# Patient Record
Sex: MALE | Race: WHITE | URBAN - METROPOLITAN AREA
[De-identification: names, ages, dates, MRNs, and addresses within clinical notes are randomized per-mention and may not be internally consistent; named-entity substitution may affect disease eponyms.]

---

## 2018-06-04 ENCOUNTER — EMERGENCY (EMERGENCY)
Facility: HOSPITAL | Age: 28
LOS: 0 days | Discharge: HOME | End: 2018-06-04
Attending: EMERGENCY MEDICINE | Admitting: EMERGENCY MEDICINE

## 2018-06-04 VITALS
OXYGEN SATURATION: 100 % | HEART RATE: 107 BPM | TEMPERATURE: 97 F | SYSTOLIC BLOOD PRESSURE: 148 MMHG | DIASTOLIC BLOOD PRESSURE: 80 MMHG | RESPIRATION RATE: 18 BRPM

## 2018-06-04 DIAGNOSIS — S61.303A UNSPECIFIED OPEN WOUND OF LEFT MIDDLE FINGER WITH DAMAGE TO NAIL, INITIAL ENCOUNTER: ICD-10-CM
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
## 2018-06-04 DIAGNOSIS — S69.92XA UNSPECIFIED INJURY OF LEFT WRIST, HAND AND FINGER(S), INITIAL ENCOUNTER: ICD-10-CM
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
## 2018-06-04 DIAGNOSIS — S60.413A ABRASION OF LEFT MIDDLE FINGER, INITIAL ENCOUNTER: ICD-10-CM

## 2018-06-04 NOTE — ED PROVIDER NOTE - OBJECTIVE STATEMENT
28 y/o male with pmhx of lipoma removal from abdomen 1 week ago, epidural hematoma s/p injury from hammer in 2016 presents with finger injury. Patient states he was putting the dumbbell down at the gym, accidentally crushed his left 3rd finger under it. Patient states his tetanus was updated 2 years ago when he sustained a hammer injury to head. Denies numbness, inability to move. Patient admits to taking aspirin everyday.
None

## 2018-06-04 NOTE — ED PROVIDER NOTE - PHYSICAL EXAMINATION
CONSTITUTIONAL: Well-developed; well-nourished; in no acute distress.   SKIN: +0.5cm abrasion to left 3rd digit, proximal to nail bed, no nail bed involvement; mild oozing noted   EXT: full ROM to left 3rd digit   NEURO: no numbness/tingling to left 3rd digit   PSYCH: Cooperative, appropriate.

## 2018-06-04 NOTE — ED PROVIDER NOTE - PROGRESS NOTE DETAILS
Attending note:  I personally evaluated the patient. I reviewed the Resident’s note (as assigned above), and agree with the findings and plan except as documented in my note.   26 y/o M with no PMH, tetanus UTD, takes ASA daily for " extra blood flow" prior to the gym, presents for evaluation of L finger abrasion s/p injury PTA. Pt states he was lifting an 80 lb barbell at the gym and dropped it onto his L 3rd digit, has been oozing blood since the time of injury. No other injuries. Denies LOC, nausea, vomiting.   Exam: Pt in NAD, AAOx3, (+)skin evulsion above the nail on the L 3rd digit, motor 5/5x4, sensation intact.   Plan: Pt stable for D/C. Attending note:  I personally evaluated the patient. I reviewed the Resident’s note (as assigned above), and agree with the findings and plan except as documented in my note.   26 y/o M presents for evaluation of L finger abrasion s/p injury PTA. Pt states he was lifting an 80 lb barbell at the gym and banged it against his L 3rd digit, has been oozing blood since the time of injury. No other injuries. Denies LOC, nausea, vomiting.   Exam: Pt in NAD, AAOx3, (+)skin avulsion above the nail on the L 3rd digit, FROM, no swelling. No active bleeding. Plan: Pt stable for D/C.

## 2018-06-24 ENCOUNTER — EMERGENCY (EMERGENCY)
Facility: HOSPITAL | Age: 28
LOS: 0 days | Discharge: HOME | End: 2018-06-24
Attending: EMERGENCY MEDICINE | Admitting: EMERGENCY MEDICINE

## 2018-06-24 VITALS
HEART RATE: 98 BPM | RESPIRATION RATE: 18 BRPM | DIASTOLIC BLOOD PRESSURE: 88 MMHG | OXYGEN SATURATION: 97 % | TEMPERATURE: 98 F | SYSTOLIC BLOOD PRESSURE: 138 MMHG
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
## 2018-06-24 DIAGNOSIS — S01.81XA LACERATION WITHOUT FOREIGN BODY OF OTHER PART OF HEAD, INITIAL ENCOUNTER: ICD-10-CM

## 2018-06-24 DIAGNOSIS — D17.1 BENIGN LIPOMATOUS NEOPLASM OF SKIN AND SUBCUTANEOUS TISSUE OF TRUNK: Chronic | ICD-10-CM
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
## 2018-06-24 NOTE — ED PROVIDER NOTE - ATTENDING CONTRIBUTION TO CARE
29 yo with mechanical fall a few hours ago.  hit chin and had laceration.  pt says came because wound kept bleeding.  pt with chin laceration about 1.2cm, no other injuries.  utd with tdap.  lac repair

## 2018-06-24 NOTE — ED PROCEDURE NOTE - ATTENDING CONTRIBUTION TO CARE
I personally evaluated the patient. I reviewed the Resident’s or Physician Assistant’s note (as assigned above), and agree with the findings and plan except as documented in my note.  I supervised the resident/PA or was immediately available to the resident/PA for this procedure.

## 2018-06-24 NOTE — ED PROVIDER NOTE - ENMT, MLM
Airway patent, Nasal mucosa clear. Mouth with normal mucosa. Throat has no vesicles, no oropharyngeal exudates and uvula is midline. normal oropharynx, no loose teeth, Airway patent, Nasal mucosa clear. Mouth with normal mucosa. Throat has no vesicles, no oropharyngeal exudates and uvula is midline. normal oropharynx, no loose teeth, no TMJ tenderness

## 2020-01-25 ENCOUNTER — EMERGENCY (EMERGENCY)
Facility: HOSPITAL | Age: 30
LOS: 0 days | Discharge: HOME | End: 2020-01-25
Admitting: EMERGENCY MEDICINE
Payer: COMMERCIAL

## 2020-01-25 VITALS
WEIGHT: 229.94 LBS | HEIGHT: 68 IN | HEART RATE: 79 BPM | TEMPERATURE: 98 F | OXYGEN SATURATION: 100 % | RESPIRATION RATE: 18 BRPM

## 2020-01-25 DIAGNOSIS — D17.1 BENIGN LIPOMATOUS NEOPLASM OF SKIN AND SUBCUTANEOUS TISSUE OF TRUNK: Chronic | ICD-10-CM

## 2020-01-25 DIAGNOSIS — R04.2 HEMOPTYSIS: ICD-10-CM

## 2020-01-25 DIAGNOSIS — K06.8 OTHER SPECIFIED DISORDERS OF GINGIVA AND EDENTULOUS ALVEOLAR RIDGE: ICD-10-CM

## 2020-01-25 PROCEDURE — 99283 EMERGENCY DEPT VISIT LOW MDM: CPT

## 2020-01-25 RX ORDER — CHLORHEXIDINE GLUCONATE 213 G/1000ML
15 SOLUTION TOPICAL
Qty: 210 | Refills: 0
Start: 2020-01-25 | End: 2020-01-31

## 2020-01-25 NOTE — ED PROVIDER NOTE - PATIENT PORTAL LINK FT
You can access the FollowMyHealth Patient Portal offered by [facility] by registering at the following website: [email or web address]. By joining [employer]’s FollowMyHealth portal, you will also be able to view your health information using other applications (apps) compatible with our system.

## 2020-01-25 NOTE — ED PROVIDER NOTE - NS ED ROS FT
Review of Systems:  	•	CONSTITUTIONAL - no fever, no diaphoresis, no chills  	•	SKIN - no rash  	•	HEMATOLOGIC - + bleeding, no bruising  	•	EYES - no eye pain, no blurry vision  	•	ENT - no change in hearing, no sore throat, no ear pain or tinnitus  	•	RESPIRATORY - no shortness of breath, no cough  	•	CARDIAC - no chest pain, no palpitations  	•	GI - no abd pain, no nausea, no vomiting, no diarrhea, no constipation  	•	GENITO-URINARY - no discharge, no dysuria; no hematuria, no increased urinary frequency  	•	MUSCULOSKELETAL - no joint paint, no swelling, no redness  	•	NEUROLOGIC - no weakness, no headache, no paresthesias, no LOC
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
## 2020-01-25 NOTE — ED PROVIDER NOTE - PHYSICAL EXAMINATION
CONST: Well appearing in NAD  EYES: PERRL, EOMI, Sclera and conjunctiva clear.   ENT: + blood to gums of tooth 24 and 25, no avulsions, no loose teeth, No nasal discharge.  Oropharynx normal appearing, no erythema or exudates. No abscess or swelling. Uvula midline.   NECK: Non-tender, no meningeal signs. normal ROM. supple   CARD: Normal S1 S2; Normal rate and rhythm  RESP: Equal BS B/L, No wheezes, rhonchi or rales. No distress  GI: Soft, non-tender, non-distended. no cva tenderness. normal BS  MS: Normal ROM in all extremities. No midline spinal tenderness. pulses 2 +. no calf tenderness or swelling  SKIN: Warm, dry, no acute rashes. Good turgor

## 2020-10-02 ENCOUNTER — APPOINTMENT (OUTPATIENT)
Dept: SURGERY | Facility: CLINIC | Age: 30
End: 2020-10-02
Payer: COMMERCIAL

## 2020-10-02 VITALS
SYSTOLIC BLOOD PRESSURE: 132 MMHG | HEART RATE: 100 BPM | BODY MASS INDEX: 30.92 KG/M2 | DIASTOLIC BLOOD PRESSURE: 84 MMHG | WEIGHT: 204 LBS | TEMPERATURE: 97.9 F | HEIGHT: 68 IN

## 2020-10-02 DIAGNOSIS — Z78.9 OTHER SPECIFIED HEALTH STATUS: ICD-10-CM

## 2020-10-02 DIAGNOSIS — Z82.49 FAMILY HISTORY OF ISCHEMIC HEART DISEASE AND OTHER DISEASES OF THE CIRCULATORY SYSTEM: ICD-10-CM

## 2020-10-02 DIAGNOSIS — Z80.1 FAMILY HISTORY OF MALIGNANT NEOPLASM OF TRACHEA, BRONCHUS AND LUNG: ICD-10-CM

## 2020-10-02 PROBLEM — Z00.00 ENCOUNTER FOR PREVENTIVE HEALTH EXAMINATION: Status: ACTIVE | Noted: 2020-10-02

## 2020-10-02 PROCEDURE — 99203 OFFICE O/P NEW LOW 30 MIN: CPT

## 2020-10-07 ENCOUNTER — APPOINTMENT (OUTPATIENT)
Dept: SURGERY | Facility: CLINIC | Age: 30
End: 2020-10-07
Payer: COMMERCIAL

## 2020-10-07 VITALS
DIASTOLIC BLOOD PRESSURE: 78 MMHG | WEIGHT: 204 LBS | HEIGHT: 68 IN | SYSTOLIC BLOOD PRESSURE: 120 MMHG | BODY MASS INDEX: 30.92 KG/M2 | TEMPERATURE: 97.4 F | HEART RATE: 90 BPM

## 2020-10-07 DIAGNOSIS — K40.90 UNILATERAL INGUINAL HERNIA, W/OUT OBSTRUCTION OR GANGRENE, NOT SPECIFIED AS RECURRENT: ICD-10-CM

## 2020-10-07 PROCEDURE — 99241 OFFICE CONSULTATION NEW/ESTAB PATIENT 15 MIN: CPT

## 2020-10-07 NOTE — HISTORY OF PRESENT ILLNESS
[de-identified] : 31yo male with PSHx of right inguinal hernia repair as infant presenting with concern for inguinal hernia. Patient states that he has had vague right groin pain for the past few weeks and then, while working out, he experienced sudden severe pain and swelling in the right groin. Denies fever/chills, difficulty with bowel movements, +flatus. He reports that he works out often and is a "gym rat."

## 2020-10-07 NOTE — ASSESSMENT
[FreeTextEntry1] : 29yo male with previous right inguinal hernia repair as infant presenting with recurrent right inguinal hernia, possible bilateral hernias.\par -ordered CT pelvis\par -recommend ice packs\par -continue surgical planning as scheduled for 10/22

## 2020-10-07 NOTE — HISTORY OF PRESENT ILLNESS
[de-identified] : 31yo male with PSHx of right inguinal hernia repair as infant previously seen last week for right groin pain consistent with inguinal pain. Patient called office yesterday complaining of groin bruising. Denies pain or fever/chills. He is ambulating well. Denies trauma or exercise or straining. He says that the swelling in the right groin has resolved.  Denies fever/chills, difficulty with bowel movements, +flatus.

## 2020-10-07 NOTE — CONSULT LETTER
[Courtesy Letter:] : I had the pleasure of seeing your patient, [unfilled], in my office today. [Please see my note below.] : Please see my note below. [Sincerely,] : Sincerely, [Dear  ___] : Dear  [unfilled], [FreeTextEntry3] : Lia Mcclendon MD\par Bariatric & Minimally Invasive Surgery\par [facility]\par [phone]

## 2020-10-07 NOTE — ASSESSMENT
[FreeTextEntry1] : 31yo male with previous right inguinal hernia repair as infant presenting with recurrent right inguinal hernia, possible bilateral hernias.\par -risks, benefits, and alteratives were explained to the patient - including risk of bleeding, pain, and infection\par -all questions were answered\par -recommend laparoscopic right inguinal hernia repair with mesh, possible bilateral\par -explained that patient should avoid straining and continued weight training until approximately 2 months after repair\par -also explained that he is at a higher than average risk of hernia recurrence if he continues to exercise to the extreme that he does\par -waive PAST\par -return to office post-operatively

## 2020-10-07 NOTE — PHYSICAL EXAM
[Normal Breath Sounds] : Normal breath sounds [Normal Heart Sounds] : normal heart sounds [Alert] : alert [Calm] : calm [de-identified] : no acute distress [de-identified] : soft, right inguinal swelling and mild tenderness, left sided mild swelling [de-identified] : no CVA tenderness [de-identified] : full ROM x 4; mesomorphic

## 2020-10-07 NOTE — PHYSICAL EXAM
[Normal Breath Sounds] : Normal breath sounds [Normal Heart Sounds] : normal heart sounds [Alert] : alert [Calm] : calm [de-identified] : no acute distress [de-identified] : soft, right inguinal swelling and mild tenderness, left sided mild swelling; diffuse ecchymosis of the scrotum and base of penis [de-identified] : no CVA tenderness [de-identified] : full ROM x 4; mesomorphic

## 2020-10-15 ENCOUNTER — OUTPATIENT (OUTPATIENT)
Dept: OUTPATIENT SERVICES | Facility: HOSPITAL | Age: 30
LOS: 1 days | Discharge: HOME | End: 2020-10-15
Payer: COMMERCIAL

## 2020-10-15 ENCOUNTER — RESULT REVIEW (OUTPATIENT)
Age: 30
End: 2020-10-15

## 2020-10-15 DIAGNOSIS — K40.90 UNILATERAL INGUINAL HERNIA, WITHOUT OBSTRUCTION OR GANGRENE, NOT SPECIFIED AS RECURRENT: ICD-10-CM

## 2020-10-15 DIAGNOSIS — D17.1 BENIGN LIPOMATOUS NEOPLASM OF SKIN AND SUBCUTANEOUS TISSUE OF TRUNK: Chronic | ICD-10-CM

## 2020-10-15 PROCEDURE — 74177 CT ABD & PELVIS W/CONTRAST: CPT | Mod: 26

## 2020-10-19 ENCOUNTER — NON-APPOINTMENT (OUTPATIENT)
Age: 30
End: 2020-10-19

## 2020-10-22 ENCOUNTER — APPOINTMENT (OUTPATIENT)
Dept: SURGERY | Facility: HOSPITAL | Age: 30
End: 2020-10-22

## 2021-07-04 ENCOUNTER — EMERGENCY (EMERGENCY)
Facility: HOSPITAL | Age: 31
LOS: 0 days | Discharge: HOME | End: 2021-07-04
Attending: EMERGENCY MEDICINE | Admitting: EMERGENCY MEDICINE
Payer: COMMERCIAL

## 2021-07-04 VITALS
RESPIRATION RATE: 17 BRPM | HEIGHT: 68 IN | OXYGEN SATURATION: 99 % | WEIGHT: 205.03 LBS | HEART RATE: 89 BPM | SYSTOLIC BLOOD PRESSURE: 170 MMHG | DIASTOLIC BLOOD PRESSURE: 94 MMHG | TEMPERATURE: 98 F

## 2021-07-04 DIAGNOSIS — S01.80XA UNSPECIFIED OPEN WOUND OF OTHER PART OF HEAD, INITIAL ENCOUNTER: ICD-10-CM

## 2021-07-04 DIAGNOSIS — D17.1 BENIGN LIPOMATOUS NEOPLASM OF SKIN AND SUBCUTANEOUS TISSUE OF TRUNK: Chronic | ICD-10-CM
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
## 2021-07-04 DIAGNOSIS — L73.1 PSEUDOFOLLICULITIS BARBAE: ICD-10-CM
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
## 2021-07-04 PROCEDURE — 99283 EMERGENCY DEPT VISIT LOW MDM: CPT

## 2021-07-04 NOTE — ED PROVIDER NOTE - OBJECTIVE STATEMENT
32 y/o male presents to the ED c/o "I had a hair transplant in [place] 3 months ago. A common side affect is ingrown hairs. I had a ingrown hair that was bleeding a couple of weeks ago. Today I brushed my hair and the scab became dislodged and began bleeding heavily. I controlled it with a towel." no dizziness/ weakness

## 2021-07-04 NOTE — ED PROVIDER NOTE - ATTENDING CONTRIBUTION TO CARE
ATTENDING NOTE: I personally evaluated the patient. I reviewed the Physician Assistant’s note (as assigned above), and agree with the findings and plan except as documented in my note.     32 y/o M presents to ED for bleeding scab on his head. Of note pt received hair transplant surgery in [place] 3 mo prior, since then he has suffered from ingrown hairs and some bleeding. He notices the bleeding when he nolan his hair and reports that when these episodes occur, it bleeds a lot. Last episode today, bleeding stopped PTA. Wants to know if there are any meds he can take if this happens again. No trauma.    Const: Well nourished, well developed, appears stated age. Eyes: PERRL, no conjunctival injection. HENT: Neck supple without meningismus. CV: RRR, Warm, well-perfused extremities. RESP: no respiratory distress. Skin: Warm, dry. (+)Small scab to R temporal region of scalp, no active bleeding. No rashes.

## 2021-07-04 NOTE — ED PROVIDER NOTE - PROGRESS NOTE DETAILS
ATTENDING NOTE: I personally evaluated the patient. I reviewed the Physician Assistant’s note (as assigned above), and agree with the findings and plan except as documented in my note.   30 y/o M presents to ED for bleeding scab on his head. Of note pt received hair transplant surgery in [place] 3 mo prior, since then he has suffered from ingrown hairs and some bleeding. He notices the bleeding when he nolan his hair and reports that when these episodes occur, it bleeds a lot. Last episode today, bleeding stopped PTA. Wants to know if there are any meds he can take if this happens again. No trauma.  Const: Well nourished, well developed, appears stated age. Eyes: PERRL, no conjunctival injection. HENT: Neck supple without meningismus. CV: RRR, Warm, well-perfused extremities. RESP: no respiratory distress. Skin: Warm, dry. (+)Small scab to R temporal region of scalp, no active bleeding. No rashes. Neuro: Alert, CNs II-XII grossly intact. Sensation and motor function of extremities grossly intact. Psych: Appropriate mood and affect.

## 2021-07-04 NOTE — ED PROVIDER NOTE - PATIENT PORTAL LINK FT
You can access the FollowMyHealth Patient Portal offered by [facility] by registering at the following website: [email or web address]. By joining [employer]’s FollowMyHealth portal, you will also be able to view your health information using other applications (apps) compatible with our system.

## 2021-07-04 NOTE — ED ADULT TRIAGE NOTE - CHIEF COMPLAINT QUOTE
"I had a hair transplant in [place] (3 months ago) I had a few ingrown hairs" Pt reports part of the hair transplant is bleeding. Minimal bleeding noted, pt applying pressure with towel in triage. NO other complaints.

## 2021-11-12 ENCOUNTER — TRANSCRIPTION ENCOUNTER (OUTPATIENT)
Age: 31
End: 2021-11-12

## 2021-11-13 ENCOUNTER — APPOINTMENT (OUTPATIENT)
Age: 31
End: 2021-11-13

## 2021-12-03 NOTE — ED PROVIDER NOTE - CHIEF COMPLAINT
Department of Emergency Medicine   ED  Provider Note  Admit Date/RoomTime: 12/3/2021  5:24 PM  ED Room: MARY/MARY          History of Present Illness:  12/3/21, Time: 5:29 PM EST  Chief Complaint   Patient presents with    Back Pain     back spasms x4 weeks, no relief. given Norco @1430 and has lido. patch on from [address] [facility] [address] is a 78 y.o. female presenting to the ED for lower and mid back pain, beginning 3-4 weeks ago. The complaint has been persistent, moderate in severity, and worsened by changing position, walking, bending. Patient states that she resides at a nursing facility, uses a walker to ambulate. She states that 3 weeks ago she began to have soreness and what she felt was spasm in the right side of her lower back. Since onset the spasm type sensation has now become more diffuse across her lower back and mid back. She states that twisting movements or changing positions worsens the pain as well as attempting to straighten up after using her walker. She denies any leg weakness or numbness/tingling. No recent falls or trauma. She denies any urine/fecal incontinence/retention. Denies any saddle paresthesias. No abdominal pain.     Review of Systems:   Pertinent positives and negatives are stated within HPI, all other systems reviewed and are negative.        --------------------------------------------- PAST HISTORY ---------------------------------------------  Past Medical History:  has a past medical history of Arthritis, Asthma, Atherosclerosis of native artery of left lower extremity with ulceration of midfoot ([address] Bronchitis, CAD (coronary artery disease), Cough, Dermatophytosis, Diabetes mellitus ([address] GERD (gastroesophageal reflux disease), History of pulmonary embolus (PE), Hx of blood clots, Hyperlipidemia, Hypertension, Leg swelling, Lung disease, Peripheral vascular angioplasty status, Pseudoaneurysm of right femoral artery ([facility] PVD (peripheral vascular disease) with claudication ([facility] Restless legs syndrome, Rhinitis, allergic, Sinusitis, SOB (shortness of breath), Type 1 diabetes mellitus with left diabetic foot ulcer ([facility] Ulcerated, foot, left, limited to breakdown of skin ([address] and Urinary incontinence. Past Surgical History:  has a past surgical history that includes Hysterectomy; Cholecystectomy; Tonsillectomy and adenoidectomy; Coronary artery bypass graft; Abdomen surgery; Appendectomy; Colonoscopy; Endoscopy, colon, diagnostic; Cardiac surgery; Foot Debridement (Left, 5/16/2021); and Foot Debridement (Left, 5/21/2021). Social History:  reports that she has never smoked. She has never used smokeless tobacco. She reports current alcohol use. She reports that she does not use drugs. Family History: family history includes Heart Disease in her brother; Hypertension in her father. . Unless otherwise noted, family history is non contributory    The patients home medications have been reviewed. Allergies: Lisinopril        ---------------------------------------------------PHYSICAL EXAM--------------------------------------    Constitutional/General: Alert and oriented x3  Head: Normocephalic and atraumatic  Eyes: PERRL, EOMI, sclera non icteric  Mouth: Oropharynx clear, handling secretions, no trismus, no asymmetry of the posterior oropharynx or uvular edema  Neck: Supple, full ROM, no stridor, no meningeal signs  Respiratory: Lungs clear to auscultation bilaterally, no wheezes, rales, or rhonchi. Not in respiratory distress  Cardiovascular:  Regular rate. Regular rhythm. No murmurs, no aortic murmurs, no gallops, or rubs. 2+ distal pulses. Equal extremity pulses. Chest: No chest wall tenderness  GI:  Abdomen Soft, Non tender, Non distended. No rebound, guarding, or rigidity. No pulsatile masses. Musculoskeletal: Moves all extremities x 4. Warm and well perfused, no clubbing, cyanosis, or edema.  Capillary refill <3 seconds  Integument: skin warm and dry. No rashes. Neurologic: GCS 15, no focal deficits, symmetric strength 5/5 in the upper and lower extremities bilaterally  Psychiatric: Normal Affect          -------------------------------------------------- RESULTS -------------------------------------------------  I have personally reviewed all laboratory and imaging results for this patient. Results are listed below.      LABS: (Lab results interpreted by me)  Results for orders placed or performed during the hospital encounter of 12/03/21   CBC Auto Differential   Result Value Ref Range    WBC 10.0 4.5 - 11.5 E9/L    RBC 2.89 (L) 3.50 - 5.50 E12/L    Hemoglobin 9.7 (L) 11.5 - 15.5 g/dL    Hematocrit 29.0 (L) 34.0 - 48.0 %    .3 (H) 80.0 - 99.9 fL    MCH 33.6 26.0 - 35.0 pg    MCHC 33.4 32.0 - 34.5 %    RDW 13.5 11.5 - 15.0 fL    Platelets 066 690 - 066 E9/L    MPV 9.9 7.0 - 12.0 fL    Neutrophils % 77.7 43.0 - 80.0 %    Immature Granulocytes % 0.4 0.0 - 5.0 %    Lymphocytes % 13.6 (L) 20.0 - 42.0 %    Monocytes % 6.7 2.0 - 12.0 %    Eosinophils % 1.4 0.0 - 6.0 %    Basophils % 0.2 0.0 - 2.0 %    Neutrophils Absolute 7.80 (H) 1.80 - 7.30 E9/L    Immature Granulocytes # 0.04 E9/L    Lymphocytes Absolute 1.37 (L) 1.50 - 4.00 E9/L    Monocytes Absolute 0.67 0.10 - 0.95 E9/L    Eosinophils Absolute 0.14 0.05 - 0.50 E9/L    Basophils Absolute 0.02 0.00 - 0.20 E4/E   Basic Metabolic Panel w/ Reflex to MG   Result Value Ref Range    Sodium 134 132 - 146 mmol/L    Potassium reflex Magnesium 4.3 3.5 - 5.0 mmol/L    Chloride 100 98 - 107 mmol/L    CO2 24 22 - 29 mmol/L    Anion Gap 10 7 - 16 mmol/L    Glucose 183 (H) 74 - 99 mg/dL    BUN 18 6 - 23 mg/dL    CREATININE 0.9 0.5 - 1.0 mg/dL    GFR Non-African American 58 >=60 mL/min/1.73    GFR African American >60     Calcium 9.5 8.6 - 10.2 mg/dL   Urinalysis, reflex to microscopic   Result Value Ref Range    Color, UA Yellow Straw/Yellow    Clarity, UA Clear Clear    Glucose, Ur Negative Negative mg/dL morphine (PF) injection 4 mg (4 mg IntraVENous Given 12/3/21 1803)   orphenadrine (NORFLEX) injection 60 mg (60 mg IntraVENous Given 12/3/21 1803)   morphine (PF) injection 4 mg (4 mg IntraVENous Given 12/3/21 2158)               Medical Decision Making:     I, Dr. Lidia Gregorio, am the primary provider of record    78year-old female presenting with lower and mid back pain and spasm. During my evaluation as she would attempt to move from my exam she would have obvious discomfort and spasm. She has no tenderness to her spine, lower extremities are unremarkable with good muscle strength. She has had no fall or trauma. She has no other symptoms to suggest intra-abdominal abnormality, no red flag symptoms to suggest spinal emergency. She is hemodynamically stable and well-appearing. Metabolic panel is within acceptable limits, no leukocytosis. Urinalysis is unremarkable for infection. Imaging of the thoracic and lumbar spine show old compression fractures but also a acute/subacute compression fracture of L5 as well as T8. Patient is tender in the area of L5, not as much at T8. Would consider L5 a possible new compression fracture. Despite muscle relaxer and pain medication she is still having difficulty moving. Family and patient feel more comfortable with admission for further management as her mobility has been significantly limited over the past several days. Patient will be admitted for further management, resting comfortably during the remainder of her ED course after further pain medication. Re-Evaluations: This patient's ED course included: a personal history and physicial examination, re-evaluation prior to disposition and IV medications    This patient has remained hemodynamically stable and been closely monitored during their ED course. Counseling:    The emergency provider has spoken with the patient and family member son and discussed todays results, in addition to providing specific details for the plan of care and counseling regarding the diagnosis and prognosis. Questions are answered at this time and they are agreeable with the plan.       --------------------------------- IMPRESSION AND DISPOSITION ---------------------------------    IMPRESSION  1. Compression fracture of L5 lumbar vertebra, closed, initial encounter ([facility]    2. Compression fracture of T8 vertebra, initial encounter ([facility]    3. Difficulty in walking        DISPOSITION  Disposition: Admit to med/surg floor  Patient condition is stable        NOTE: This report was transcribed using voice recognition software.  Every effort was made to ensure accuracy; however, inadvertent computerized transcription errors may be present        Jw Hampton DO  12/03/21 [id number] The patient is a 31y Male complaining of medical evaluation.

## 2022-07-15 ENCOUNTER — EMERGENCY (EMERGENCY)
Facility: HOSPITAL | Age: 32
LOS: 0 days | Discharge: HOME | End: 2022-07-15
Attending: EMERGENCY MEDICINE | Admitting: EMERGENCY MEDICINE

## 2022-07-15 VITALS
OXYGEN SATURATION: 97 % | SYSTOLIC BLOOD PRESSURE: 150 MMHG | RESPIRATION RATE: 18 BRPM | WEIGHT: 216.05 LBS | HEIGHT: 68 IN | HEART RATE: 89 BPM | DIASTOLIC BLOOD PRESSURE: 85 MMHG | TEMPERATURE: 97 F

## 2022-07-15 DIAGNOSIS — Z53.21 PROCEDURE AND TREATMENT NOT CARRIED OUT DUE TO PATIENT LEAVING PRIOR TO BEING SEEN BY HEALTH CARE PROVIDER: ICD-10-CM

## 2022-07-15 DIAGNOSIS — D17.1 BENIGN LIPOMATOUS NEOPLASM OF SKIN AND SUBCUTANEOUS TISSUE OF TRUNK: Chronic | ICD-10-CM

## 2022-07-15 DIAGNOSIS — R04.0 EPISTAXIS: ICD-10-CM

## 2022-07-15 PROCEDURE — L9991: CPT

## 2022-07-15 NOTE — ED PROVIDER NOTE - NS ED ATTENDING STATEMENT MOD
This was a shared visit with the TRAN. I reviewed and verified the documentation and independently performed the documented:

## 2022-07-15 NOTE — ED PROVIDER NOTE - CLINICAL SUMMARY MEDICAL DECISION MAKING FREE TEXT BOX
32-year-old male presents emergency department complaining of epistaxis.  Left on his own volition according to nursing staff prior to formal PA or physician evaluation.  Agree with PA's note.

## 2022-07-15 NOTE — ED PROVIDER NOTE - ATTENDING APP SHARED VISIT CONTRIBUTION OF CARE
I reviewed the Resident´s or Physician Assistant´s note (as assigned above), and agree with the findings and plan except as documented in my note.    32-year-old male presents to the Emergency Department complaining of epistaxis.  Patient eloped prior to my formal evaluation.  Agree with PAs note

## 2022-09-26 ENCOUNTER — EMERGENCY (EMERGENCY)
Facility: HOSPITAL | Age: 32
LOS: 0 days | Discharge: HOME | End: 2022-09-26
Attending: EMERGENCY MEDICINE | Admitting: EMERGENCY MEDICINE

## 2022-09-26 VITALS
HEIGHT: 68 IN | SYSTOLIC BLOOD PRESSURE: 154 MMHG | WEIGHT: 214.95 LBS | HEART RATE: 87 BPM | OXYGEN SATURATION: 99 % | RESPIRATION RATE: 16 BRPM | TEMPERATURE: 97 F | DIASTOLIC BLOOD PRESSURE: 100 MMHG

## 2022-09-26 DIAGNOSIS — D17.1 BENIGN LIPOMATOUS NEOPLASM OF SKIN AND SUBCUTANEOUS TISSUE OF TRUNK: Chronic | ICD-10-CM

## 2022-09-26 DIAGNOSIS — Z86.018 PERSONAL HISTORY OF OTHER BENIGN NEOPLASM: ICD-10-CM

## 2022-09-26 DIAGNOSIS — S81.811A LACERATION WITHOUT FOREIGN BODY, RIGHT LOWER LEG, INITIAL ENCOUNTER: ICD-10-CM
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
## 2022-09-26 DIAGNOSIS — Z23 ENCOUNTER FOR IMMUNIZATION: ICD-10-CM
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
## 2022-09-26 PROCEDURE — 73590 X-RAY EXAM OF LOWER LEG: CPT | Mod: 26,RT

## 2022-09-26 PROCEDURE — 99283 EMERGENCY DEPT VISIT LOW MDM: CPT | Mod: 25

## 2022-09-26 PROCEDURE — 12001 RPR S/N/AX/GEN/TRNK 2.5CM/<: CPT

## 2022-09-26 RX ORDER — TETANUS TOXOID, REDUCED DIPHTHERIA TOXOID AND ACELLULAR PERTUSSIS VACCINE, ADSORBED 5; 2.5; 8; 8; 2.5 [IU]/.5ML; [IU]/.5ML; UG/.5ML; UG/.5ML; UG/.5ML
0.5 SUSPENSION INTRAMUSCULAR ONCE
Refills: 0 | Status: COMPLETED | OUTPATIENT
Start: 2022-09-26 | End: 2022-09-26

## 2022-09-26 RX ADMIN — TETANUS TOXOID, REDUCED DIPHTHERIA TOXOID AND ACELLULAR PERTUSSIS VACCINE, ADSORBED 0.5 MILLILITER(S): 5; 2.5; 8; 8; 2.5 SUSPENSION INTRAMUSCULAR at 09:20

## 2022-09-26 NOTE — ED PROVIDER NOTE - PHYSICAL EXAMINATION
CONST: Well appearing in NAD  MS: Normal ROM in all extremities. No midline spinal tenderness. 1.5 cm linear lac to mid anterior shin right leg. Mild bleeding with hematoma. No NV injury or FB noted  SKIN: Warm, dry, no acute rashes. Good turgor  NEURO: A&Ox3, No focal deficits. Strength 5/5 with no sensory deficits. Steady gait

## 2022-09-26 NOTE — ED PROVIDER NOTE - CLINICAL SUMMARY MEDICAL DECISION MAKING FREE TEXT BOX
Patient presented status post accidentally scraping her shin on concrete steps prior to arrival.  States that she is having pain around the right tib-fib area since the incident with laceration.  Has been able to ambulate without difficulty.  Otherwise on arrival patient afebrile, hemodynamically stable, fully neurovascularly intact.  Laceration noted but bleeding had been controlled prior to arrival.  X-ray obtained and negative for acute fracture or dislocation.  Laceration irrigated and sutured in ED without complication.  Given Tdap.  Given the above, will discharge home with outpatient follow up. Patient agreeable with plan. Agrees to return to ED for any new or worsening symptoms.

## 2022-09-26 NOTE — ED PROVIDER NOTE - OBJECTIVE STATEMENT
Pt sustained lac to RLE 10pm last night. trip and banged shin against edge of concrete steps. Denies head injury, LOC. Able to walk on the leg. Has 1.5 cm lac that continues to bleed

## 2022-09-26 NOTE — ED PROVIDER NOTE - PATIENT PORTAL LINK FT
You can access the FollowMyHealth Patient Portal offered by [facility] by registering at the following website: [email or web address]. By joining [employer]’s FollowMyHealth portal, you will also be able to view your health information using other applications (apps) compatible with our system.

## 2022-09-26 NOTE — ED PROVIDER NOTE - CARE PROVIDER_API CALL
sutures to be removed in 10 days by PMD or return to urgent care. Return is fever, worsening pain, swelling or redness.,   Phone: (   )    -  Fax: (   )    -  Follow Up Time:

## 2022-09-26 NOTE — ED ADULT TRIAGE NOTE - CHIEF COMPLAINT QUOTE
pt reports falling down the stairs last night, presents with lacerations to his L leg  (+)ROM,(+)pulse  (-)HT/(-)LOC/(-)AC pt reports falling down the stairs last night, presents with lacerations to his R leg  (+)ROM,(+)pulse  (-)HT/(-)LOC/(-)AC

## 2022-09-26 NOTE — ED ADULT NURSE NOTE - CHIEF COMPLAINT QUOTE
pt reports falling down the stairs last night, presents with lacerations to his R leg  (+)ROM,(+)pulse  (-)HT/(-)LOC/(-)AC

## 2022-09-26 NOTE — ED ADULT NURSE NOTE - PAIN: BODY LOCATION
RICK[facility] scheduling packet completed and sent to [facility] by writer via [employer].    No authorization required per referral #[id number].    Awaiting pt to complete pre-op labs and EKG to complete L/[facility] scheduling packet.   ankle/Right:

## 2022-09-26 NOTE — ED PROVIDER NOTE - PROVIDER TOKENS
FREE:[LAST:[sutures to be removed in 10 days by PMD or return to urgent care. Return is fever, worsening pain, swelling or redness.],PHONE:[(   )    -],FAX:[(   )    -]]

## 2022-09-26 NOTE — ED ADULT NURSE NOTE - NSIMPLEMENTINTERV_GEN_ALL_ED
Implemented All Fall Risk Interventions:  [place] to call system. Call bell, personal items and telephone within reach. Instruct patient to call for assistance. Room bathroom lighting operational. Non-slip footwear when patient is off stretcher. Physically safe environment: no spills, clutter or unnecessary equipment. Stretcher in lowest position, wheels locked, appropriate side rails in place. Provide visual cue, wrist band, yellow gown, etc. Monitor gait and stability. Monitor for mental status changes and reorient to person, place, and time. Review medications for side effects contributing to fall risk. Reinforce activity limits and safety measures with patient and family.

## 2022-10-01 ENCOUNTER — EMERGENCY (EMERGENCY)
Facility: HOSPITAL | Age: 32
LOS: 0 days | Discharge: HOME | End: 2022-10-01

## 2022-10-01 DIAGNOSIS — D17.1 BENIGN LIPOMATOUS NEOPLASM OF SKIN AND SUBCUTANEOUS TISSUE OF TRUNK: Chronic | ICD-10-CM

## 2022-10-01 PROCEDURE — L9992: CPT

## 2022-10-05 DIAGNOSIS — Z53.21 PROCEDURE AND TREATMENT NOT CARRIED OUT DUE TO PATIENT LEAVING PRIOR TO BEING SEEN BY HEALTH CARE PROVIDER: ICD-10-CM

## 2022-10-06 DIAGNOSIS — Z02.9 ENCOUNTER FOR ADMINISTRATIVE EXAMINATIONS, UNSPECIFIED: ICD-10-CM

## 2022-10-08 ENCOUNTER — EMERGENCY (EMERGENCY)
Facility: HOSPITAL | Age: 32
LOS: 0 days | Discharge: HOME | End: 2022-10-08
Attending: STUDENT IN AN ORGANIZED HEALTH CARE EDUCATION/TRAINING PROGRAM | Admitting: STUDENT IN AN ORGANIZED HEALTH CARE EDUCATION/TRAINING PROGRAM

## 2022-10-08 VITALS
HEART RATE: 97 BPM | TEMPERATURE: 97 F | SYSTOLIC BLOOD PRESSURE: 129 MMHG | DIASTOLIC BLOOD PRESSURE: 78 MMHG | HEIGHT: 68 IN | OXYGEN SATURATION: 100 % | RESPIRATION RATE: 18 BRPM | WEIGHT: 214.95 LBS

## 2022-10-08 DIAGNOSIS — R21 RASH AND OTHER NONSPECIFIC SKIN ERUPTION: ICD-10-CM

## 2022-10-08 DIAGNOSIS — L03.115 CELLULITIS OF RIGHT LOWER LIMB: ICD-10-CM

## 2022-10-08 DIAGNOSIS — M79.661 PAIN IN RIGHT LOWER LEG: ICD-10-CM

## 2022-10-08 DIAGNOSIS — D17.1 BENIGN LIPOMATOUS NEOPLASM OF SKIN AND SUBCUTANEOUS TISSUE OF TRUNK: Chronic | ICD-10-CM

## 2022-10-08 DIAGNOSIS — M79.89 OTHER SPECIFIED SOFT TISSUE DISORDERS: ICD-10-CM

## 2022-10-08 DIAGNOSIS — Z48.02 ENCOUNTER FOR REMOVAL OF SUTURES: ICD-10-CM

## 2022-10-08 PROCEDURE — 73610 X-RAY EXAM OF ANKLE: CPT | Mod: 26,RT

## 2022-10-08 PROCEDURE — 73590 X-RAY EXAM OF LOWER LEG: CPT | Mod: 26,RT

## 2022-10-08 PROCEDURE — 99284 EMERGENCY DEPT VISIT MOD MDM: CPT

## 2022-10-08 RX ORDER — KETOROLAC TROMETHAMINE 30 MG/ML
60 SYRINGE (ML) INJECTION ONCE
Refills: 0 | Status: DISCONTINUED | OUTPATIENT
Start: 2022-10-08 | End: 2022-10-08

## 2022-10-08 RX ORDER — CEPHALEXIN 500 MG
1 CAPSULE ORAL
Qty: 40 | Refills: 0
Start: 2022-10-08 | End: 2022-10-17

## 2022-10-08 RX ORDER — AZTREONAM 2 G
1 VIAL (EA) INJECTION
Qty: 20 | Refills: 0
Start: 2022-10-08 | End: 2022-10-17

## 2022-10-08 RX ADMIN — Medication 30 MILLIGRAM(S): at 13:33

## 2022-10-08 NOTE — ED PROVIDER NOTE - OBJECTIVE STATEMENT
32-year-old male no past medical history coming in here for suture removal.  Patient complaining of pain to and swelling to the right lower leg from the tib-fib to ankle region.  Patient states that he has pain when walking and has gotten swollen over the past couple days.  Patient was due to remove his sutures 2 days ago.  No history of any diabetes or IV drug use, fevers.

## 2022-10-08 NOTE — ED ADULT TRIAGE NOTE - AS TEMP SITE
Bedside and Verbal shift change report received from Ct Vail RN (offgoing nurse). Report included the following information SBAR, Kardex, Intake/Output, MAR and Recent Results. 2045 Shift assessment completed, see EMR. When patient was asked if she was in patient she started to cry and nod yes. 0015 reassessment completed, completed EMR. Patient given CHG bath, patient given pain    0445 Reassessment completed, see EMR. PTT 98.2 no change to heparin drip. Bedside and Verbal shift change report given to SLY Borden RN (oncoming nurse)Report included the following information SBAR, Kardex, Intake/Output, MAR and Recent Results. oral

## 2022-10-08 NOTE — ED PROVIDER NOTE - CLINICAL SUMMARY MEDICAL DECISION MAKING FREE TEXT BOX
.    31 y/o M no sig pmh, here for suture removal from R leg, placed 9/26. + increasing swelling and pain from around area of wound to entire leg. No fever, new trauma, cp, sob, prior VTE, steroid use.    Agree w/ exam above, + swelling and erythema at site of suture placement w/ extension to inferiorly to ankle, and to calf; No popliteal ttp; NL pulses; NL neuro exam; pt ambulatory.    sutures removed. Pt felt better after analgesia  Xray shows no gas of other acute finding.    IMP: cellulitis  P: Pt stable for dc w/ 2d ED wound check, abx rx, w/ outpt f/up as needed, and care as discussed.  Pt understands plan and signs and symptoms for ED return. DC home.     .
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
## 2022-10-08 NOTE — ED PROVIDER NOTE - PHYSICAL EXAMINATION
CONSTITUTIONAL:  in no acute distress.   SKIN: warm, dry  HEAD: Normocephalic; atraumatic.  EYES: PERRL, EOMI, normal sclera and conjunctiva   ENT: No nasal discharge; airway clear.  NECK: Supple; non tender.  CARD:  Regular rate and rhythm.   RESP: NO inc WOB   ABD: soft ntnd  EXT: swelling and erythema to rle, pulses intact  LYMPH: No acute cervical adenopathy.  NEURO: Alert, oriented, grossly unremarkable  PSYCH: Cooperative, appropriate.

## 2022-10-08 NOTE — ED PROVIDER NOTE - PATIENT PORTAL LINK FT
You can access the FollowMyHealth Patient Portal offered by [facility] by registering at the following website: [email or web address]. By joining [employer]’s FollowMyHealth portal, you will also be able to view your health information using other applications (apps) compatible with our system.

## 2022-10-10 ENCOUNTER — EMERGENCY (EMERGENCY)
Facility: HOSPITAL | Age: 32
LOS: 0 days | Discharge: HOME | End: 2022-10-10
Attending: STUDENT IN AN ORGANIZED HEALTH CARE EDUCATION/TRAINING PROGRAM | Admitting: STUDENT IN AN ORGANIZED HEALTH CARE EDUCATION/TRAINING PROGRAM

## 2022-10-10 VITALS
WEIGHT: 235.89 LBS | RESPIRATION RATE: 18 BRPM | HEIGHT: 68 IN | TEMPERATURE: 99 F | HEART RATE: 103 BPM | SYSTOLIC BLOOD PRESSURE: 174 MMHG | DIASTOLIC BLOOD PRESSURE: 94 MMHG | OXYGEN SATURATION: 100 %

## 2022-10-10 DIAGNOSIS — D17.1 BENIGN LIPOMATOUS NEOPLASM OF SKIN AND SUBCUTANEOUS TISSUE OF TRUNK: Chronic | ICD-10-CM

## 2022-10-10 DIAGNOSIS — L03.115 CELLULITIS OF RIGHT LOWER LIMB: ICD-10-CM

## 2022-10-10 PROCEDURE — 99282 EMERGENCY DEPT VISIT SF MDM: CPT

## 2022-10-10 NOTE — ED PROVIDER NOTE - PATIENT PORTAL LINK FT
You can access the FollowMyHealth Patient Portal offered by [facility] by registering at the following website: [email or web address]. By joining [employer]’s FollowMyHealth portal, you will also be able to view your health information using other applications (apps) compatible with our system.

## 2022-10-10 NOTE — ED PROVIDER NOTE - PHYSICAL EXAMINATION
CONSTITUTIONAL:  in no acute distress.   SKIN: warm, dry  HEAD: Normocephalic; atraumatic.  EYES: PERRL, EOMI, normal sclera and conjunctiva   ENT: No nasal discharge; airway clear.  NECK: Supple; non tender.  CARD:  Regular rate and rhythm.   RESP: NO inc WOB   ABD: soft ntnd  EXT: Normal ROM.  swelling and erythema to rle, pulses intact- improved from 8th  LYMPH: No acute cervical adenopathy.  NEURO: Alert, oriented, grossly unremarkable  PSYCH: Cooperative, appropriate.

## 2022-10-10 NOTE — ED PROVIDER NOTE - CLINICAL SUMMARY MEDICAL DECISION MAKING FREE TEXT BOX
I personally evaluated the patient. I reviewed the Resident´s or Physician Assistant´s note (as assigned above), and agree with the findings and plan except as documented in my note.  Patient evaluated for wound check.  Wound improving, patient states pain is improving.  Still on course of antibiotics and instructed to continue.  I have fully discussed the medical management and delivery of care with the patient. I have discussed any available labs, imaging and treatment options with the patient. Patient confirms understanding and has been given detailed return precautions. Patient instructed to return to the ED should symptoms persist or worsen. Patient has demonstrated capacity and has verbalized understanding. Patient is well appearing upon discharge.

## 2022-10-10 NOTE — ED PROVIDER NOTE - OBJECTIVE STATEMENT
32-year-old male no past medical history coming in here for follow-up of cellulitis.  Patient was seen here 2 days ago by me for wound closure.  Patient developed cellulitis and is here for follow-up.  Today patient reports that he is able to bear more weight on his leg and that he believes the infection is healing and improving.  Patient still complains of tightness to the skin but understands that this will improve with antibiotics and time.  Patient is on day 2 of antibiotics and did not take on the eighth.

## 2022-10-10 NOTE — ED PROVIDER NOTE - ATTENDING CONTRIBUTION TO CARE
32-year-old male no past medical history presents with wound check.  Patient seen 2 days ago in the ED for cellulitis.  Instructed to come back to the ED to reassess the wound.  Patient states that wound has been healing well, redness has decreased and pain has improved.  No fever/chills, no nausea/diarrhea, no new trauma or inciting incident.    CONSTITUTIONAL: Well-developed; well-nourished; in no acute distress. Sitting up and providing appropriate history and physical examination  SKIN: + Erythema over right leg with no crepitus, no wound drainage.  Otherwise skin exam is warm and dry, no acute rash.  HEAD: Normocephalic; atraumatic.  EYES: PERRL, 3 mm bilateral, no nystagmus, EOM intact; conjunctiva and sclera clear.  ENT: No nasal discharge; airway clear.  NECK: Supple; non tender. + full passive ROM in all directions. No JVD  EXT: Normal ROM. No clubbing, cyanosis or edema. Dp and Pt Pulses intact. Cap refill less than 3 seconds  NEURO: CN 2-12 intact, normal finger to nose, normal romberg, stable gait, no sensory or motor deficits, Alert, oriented, grossly unremarkable. No Focal deficits. GCS 15. NIH 0  PSYCH: Cooperative, appropriate.

## 2022-10-24 ENCOUNTER — EMERGENCY (EMERGENCY)
Facility: HOSPITAL | Age: 32
LOS: 0 days | Discharge: HOME | End: 2022-10-24
Attending: EMERGENCY MEDICINE | Admitting: EMERGENCY MEDICINE

## 2022-10-24 VITALS
HEIGHT: 68 IN | RESPIRATION RATE: 16 BRPM | DIASTOLIC BLOOD PRESSURE: 83 MMHG | SYSTOLIC BLOOD PRESSURE: 137 MMHG | WEIGHT: 225.09 LBS | TEMPERATURE: 98 F | HEART RATE: 95 BPM | OXYGEN SATURATION: 99 %

## 2022-10-24 DIAGNOSIS — L03.115 CELLULITIS OF RIGHT LOWER LIMB: ICD-10-CM

## 2022-10-24 DIAGNOSIS — D17.1 BENIGN LIPOMATOUS NEOPLASM OF SKIN AND SUBCUTANEOUS TISSUE OF TRUNK: Chronic | ICD-10-CM

## 2022-10-24 PROCEDURE — 99284 EMERGENCY DEPT VISIT MOD MDM: CPT | Mod: 25

## 2022-10-24 NOTE — ED PROVIDER NOTE - NSFOLLOWUPINSTRUCTIONS_ED_ALL_ED_FT
Follow up with PMD and Dermatology in 1-2 days.    Cellulitis    Cellulitis is a skin infection caused by bacteria. This condition occurs most often in the arms and lower legs but can occur anywhere over the body. Symptoms include redness, swelling, warm skin, tenderness, and chills/fever. If you were prescribed an antibiotic medicine, take it as told by your health care provider. Do not stop taking the antibiotic even if you start to feel better.    SEEK IMMEDIATE MEDICAL CARE IF YOU HAVE ANY OF THE FOLLOWING SYMPTOMS: worsening fever, red streaks coming from affected area, vomiting or diarrhea, or dizziness/lightheadedness.

## 2022-10-24 NOTE — ED PROVIDER NOTE - OBJECTIVE STATEMENT
32y M pmh RLE cellulitis presents for eval of redness. Pt developed RLE cellulitis x1mo ago after sustaining laceration that has since resolved with keflex and bactrim. Now presents with new area of redness and swelling to distal RLE with mild burning pain, aggravated by touch, no relieving factors. Denies fever, numbness, weakness

## 2022-10-24 NOTE — ED PROVIDER NOTE - PATIENT PORTAL LINK FT
You can access the FollowMyHealth Patient Portal offered by [facility] by registering at the following website: [email or web address]. By joining [email or web address]’s FollowMyHealth portal, you will also be able to view your health information using other applications (apps) compatible with our system.

## 2022-10-24 NOTE — ED PROVIDER NOTE - PHYSICAL EXAMINATION
CONST: NAD  EYES: Sclera and conjunctiva clear.   ENT: No nasal discharge. Oropharynx normal appearing  NECK: Non-tender, no meningeal signs. normal ROM. supple   CARD: S1 S2; No jvd  RESP: Equal BS B/L, No wheezes, rhonchi or rales. No distress  GI: Soft, non-tender, non-distended. normal BS  MS: Normal ROM in all extremities. pulses 2 +. no calf tenderness or swelling  SKIN: Erythema and edema to RLE  NEURO: A&Ox3, No focal deficits. Strength 5/5 with no sensory deficits. Steady gait.

## 2022-10-24 NOTE — ED PROVIDER NOTE - NS ED ROS FT
Constitutional: (-) fever  Eyes/ENT: (-) blurry vision, (-) epistaxis  Cardiovascular: (-) chest pain, (-) syncope  Respiratory: (-) cough, (-) shortness of breath  Gastrointestinal: (-) vomiting, (-) diarrhea  Musculoskeletal: (-) neck pain, (-) back pain, (-) joint pain  Integumentary: (+) redness, (+) swelling  Neurological: (-) headache, (-) altered mental status  Psychiatric: (-) hallucinations  Allergic/Immunologic: (-) pruritus

## 2022-10-24 NOTE — ED PROVIDER NOTE - CLINICAL SUMMARY MEDICAL DECISION MAKING FREE TEXT BOX
Patient presented with 1 month of RLE cellulitis s/p laceration to the leg 1 month ago. States previously it resolved with bactrim but then recurred in the same area soon after. Otherwise afebrile, HD stable, neurovascularly intact. (+) cellulitis changes noted to anterior R tibial region with no fluctuance, no crepitus, compartments soft. POCUS showed (+) cellulitic changes but no drainable fluid collection. Given the above, will discharge home with PO abx, close outpatient follow up and return precautions. Patient agreeable with plan. Agrees to return to ED for any new or worsening symptoms.

## 2022-10-24 NOTE — ED PROVIDER NOTE - CARE PROVIDER_API CALL
Steve Basilio)  Dermatology; Internal Medicine  [address] [place] [address]  Phone: [phone]  Fax: [phone]  Follow Up Time:

## 2022-12-12 NOTE — ED ADULT NURSE NOTE - HARM RISK FACTORS
Note Text (......Xxx Chief Complaint.): This diagnosis correlates with the Detail Level: Zone Render Risk Assessment In Note?: no Other (Free Text): Referred for [facility]RT at [facility] no

## 2023-06-14 ENCOUNTER — EMERGENCY (EMERGENCY)
Facility: HOSPITAL | Age: 33
LOS: 0 days | Discharge: HOME | End: 2023-06-14
Attending: EMERGENCY MEDICINE
Payer: COMMERCIAL

## 2023-06-14 VITALS
TEMPERATURE: 98 F | SYSTOLIC BLOOD PRESSURE: 148 MMHG | DIASTOLIC BLOOD PRESSURE: 79 MMHG | OXYGEN SATURATION: 100 % | HEART RATE: 95 BPM | RESPIRATION RATE: 18 BRPM

## 2023-06-14 DIAGNOSIS — K92.0 HEMATEMESIS: ICD-10-CM

## 2023-06-14 DIAGNOSIS — D17.1 BENIGN LIPOMATOUS NEOPLASM OF SKIN AND SUBCUTANEOUS TISSUE OF TRUNK: Chronic | ICD-10-CM

## 2023-06-14 DIAGNOSIS — F41.9 ANXIETY DISORDER, UNSPECIFIED: ICD-10-CM

## 2023-06-14 LAB
ALBUMIN SERPL ELPH-MCNC: 4.6 G/DL — SIGNIFICANT CHANGE UP (ref 3.5–5.2)
ALP SERPL-CCNC: 97 U/L — SIGNIFICANT CHANGE UP (ref 30–115)
ALT FLD-CCNC: 39 U/L — SIGNIFICANT CHANGE UP (ref 0–41)
ANION GAP SERPL CALC-SCNC: 12 MMOL/L — SIGNIFICANT CHANGE UP (ref 7–14)
AST SERPL-CCNC: 31 U/L — SIGNIFICANT CHANGE UP (ref 0–41)
BASOPHILS # BLD AUTO: 0.04 K/UL — SIGNIFICANT CHANGE UP (ref 0–0.2)
BASOPHILS NFR BLD AUTO: 0.4 % — SIGNIFICANT CHANGE UP (ref 0–1)
BILIRUB SERPL-MCNC: 0.4 MG/DL — SIGNIFICANT CHANGE UP (ref 0.2–1.2)
BUN SERPL-MCNC: 12 MG/DL — SIGNIFICANT CHANGE UP (ref 10–20)
CALCIUM SERPL-MCNC: 9.7 MG/DL — SIGNIFICANT CHANGE UP (ref 8.4–10.5)
CHLORIDE SERPL-SCNC: 100 MMOL/L — SIGNIFICANT CHANGE UP (ref 98–110)
CO2 SERPL-SCNC: 26 MMOL/L — SIGNIFICANT CHANGE UP (ref 17–32)
CREAT SERPL-MCNC: 1.1 MG/DL — SIGNIFICANT CHANGE UP (ref 0.7–1.5)
EGFR: 90 ML/MIN/1.73M2 — SIGNIFICANT CHANGE UP
EOSINOPHIL # BLD AUTO: 0.01 K/UL — SIGNIFICANT CHANGE UP (ref 0–0.7)
EOSINOPHIL NFR BLD AUTO: 0.1 % — SIGNIFICANT CHANGE UP (ref 0–8)
GLUCOSE SERPL-MCNC: 90 MG/DL — SIGNIFICANT CHANGE UP (ref 70–99)
HCT VFR BLD CALC: 53.7 % — HIGH (ref 42–52)
HGB BLD-MCNC: 18.1 G/DL — HIGH (ref 14–18)
IMM GRANULOCYTES NFR BLD AUTO: 0.1 % — SIGNIFICANT CHANGE UP (ref 0.1–0.3)
LACTATE SERPL-SCNC: 1.2 MMOL/L — SIGNIFICANT CHANGE UP (ref 0.7–2)
LIDOCAIN IGE QN: 27 U/L — SIGNIFICANT CHANGE UP (ref 7–60)
LYMPHOCYTES # BLD AUTO: 1.15 K/UL — LOW (ref 1.2–3.4)
LYMPHOCYTES # BLD AUTO: 10.7 % — LOW (ref 20.5–51.1)
MCHC RBC-ENTMCNC: 29.3 PG — SIGNIFICANT CHANGE UP (ref 27–31)
MCHC RBC-ENTMCNC: 33.7 G/DL — SIGNIFICANT CHANGE UP (ref 32–37)
MCV RBC AUTO: 87 FL — SIGNIFICANT CHANGE UP (ref 80–94)
MONOCYTES # BLD AUTO: 0.76 K/UL — HIGH (ref 0.1–0.6)
MONOCYTES NFR BLD AUTO: 7.1 % — SIGNIFICANT CHANGE UP (ref 1.7–9.3)
NEUTROPHILS # BLD AUTO: 8.75 K/UL — HIGH (ref 1.4–6.5)
NEUTROPHILS NFR BLD AUTO: 81.6 % — HIGH (ref 42.2–75.2)
NRBC # BLD: 0 /100 WBCS — SIGNIFICANT CHANGE UP (ref 0–0)
PLATELET # BLD AUTO: 210 K/UL — SIGNIFICANT CHANGE UP (ref 130–400)
PMV BLD: 10.3 FL — SIGNIFICANT CHANGE UP (ref 7.4–10.4)
POTASSIUM SERPL-MCNC: 4.3 MMOL/L — SIGNIFICANT CHANGE UP (ref 3.5–5)
POTASSIUM SERPL-SCNC: 4.3 MMOL/L — SIGNIFICANT CHANGE UP (ref 3.5–5)
PROT SERPL-MCNC: 7.2 G/DL — SIGNIFICANT CHANGE UP (ref 6–8)
RBC # BLD: 6.17 M/UL — HIGH (ref 4.7–6.1)
RBC # FLD: 13.2 % — SIGNIFICANT CHANGE UP (ref 11.5–14.5)
SODIUM SERPL-SCNC: 138 MMOL/L — SIGNIFICANT CHANGE UP (ref 135–146)
TROPONIN T SERPL-MCNC: <0.01 NG/ML — SIGNIFICANT CHANGE UP
WBC # BLD: 10.72 K/UL — SIGNIFICANT CHANGE UP (ref 4.8–10.8)
WBC # FLD AUTO: 10.72 K/UL — SIGNIFICANT CHANGE UP (ref 4.8–10.8)

## 2023-06-14 PROCEDURE — 83605 ASSAY OF LACTIC ACID: CPT

## 2023-06-14 PROCEDURE — 80053 COMPREHEN METABOLIC PANEL: CPT

## 2023-06-14 PROCEDURE — 96374 THER/PROPH/DIAG INJ IV PUSH: CPT

## 2023-06-14 PROCEDURE — 85025 COMPLETE CBC W/AUTO DIFF WBC: CPT

## 2023-06-14 PROCEDURE — 93005 ELECTROCARDIOGRAM TRACING: CPT

## 2023-06-14 PROCEDURE — 71046 X-RAY EXAM CHEST 2 VIEWS: CPT | Mod: 26

## 2023-06-14 PROCEDURE — 84484 ASSAY OF TROPONIN QUANT: CPT

## 2023-06-14 PROCEDURE — 83690 ASSAY OF LIPASE: CPT

## 2023-06-14 PROCEDURE — 93010 ELECTROCARDIOGRAM REPORT: CPT

## 2023-06-14 PROCEDURE — 99285 EMERGENCY DEPT VISIT HI MDM: CPT | Mod: 25

## 2023-06-14 PROCEDURE — 96375 TX/PRO/DX INJ NEW DRUG ADDON: CPT

## 2023-06-14 PROCEDURE — 71046 X-RAY EXAM CHEST 2 VIEWS: CPT

## 2023-06-14 PROCEDURE — 36415 COLL VENOUS BLD VENIPUNCTURE: CPT

## 2023-06-14 PROCEDURE — 99285 EMERGENCY DEPT VISIT HI MDM: CPT

## 2023-06-14 RX ORDER — FAMOTIDINE 10 MG/ML
1 INJECTION INTRAVENOUS
Qty: 30 | Refills: 0
Start: 2023-06-14 | End: 2023-07-13

## 2023-06-14 RX ORDER — FAMOTIDINE 10 MG/ML
20 INJECTION INTRAVENOUS ONCE
Refills: 0 | Status: COMPLETED | OUTPATIENT
Start: 2023-06-14 | End: 2023-06-14

## 2023-06-14 RX ORDER — SODIUM CHLORIDE 9 MG/ML
1000 INJECTION, SOLUTION INTRAVENOUS ONCE
Refills: 0 | Status: COMPLETED | OUTPATIENT
Start: 2023-06-14 | End: 2023-06-14

## 2023-06-14 RX ORDER — ONDANSETRON 8 MG/1
4 TABLET, FILM COATED ORAL ONCE
Refills: 0 | Status: COMPLETED | OUTPATIENT
Start: 2023-06-14 | End: 2023-06-14

## 2023-06-14 RX ORDER — ALPRAZOLAM 0.25 MG
0.5 TABLET ORAL ONCE
Refills: 0 | Status: DISCONTINUED | OUTPATIENT
Start: 2023-06-14 | End: 2023-06-14

## 2023-06-14 RX ADMIN — Medication 0.5 MILLIGRAM(S): at 19:25

## 2023-06-14 RX ADMIN — FAMOTIDINE 20 MILLIGRAM(S): 10 INJECTION INTRAVENOUS at 19:24

## 2023-06-14 RX ADMIN — SODIUM CHLORIDE 1000 MILLILITER(S): 9 INJECTION, SOLUTION INTRAVENOUS at 19:29

## 2023-06-14 RX ADMIN — ONDANSETRON 4 MILLIGRAM(S): 8 TABLET, FILM COATED ORAL at 19:24

## 2023-06-14 NOTE — ED PROVIDER NOTE - NSFOLLOWUPCLINICS_GEN_ALL_ED_FT
[facility]  Dental  [address] [place] [id number]  Phone: [phone]  Fax:   Follow Up Time: 1-3 Days

## 2023-06-14 NOTE — ED PROVIDER NOTE - NSFOLLOWUPINSTRUCTIONS_ED_ALL_ED_FT
Our Emergency Department Referral Coordinators will be reaching out to you in the next 24-48 hours from 9:00am to 5:00pm with a follow up appointment. Please expect a phone call from the hospital in that time frame. If you do not receive a call or if you have any questions or concerns, you can reach them at   [phone]     Nausea / Vomiting    Nausea is the feeling that you have an upset stomach or have to vomit. As nausea gets worse, it can lead to vomiting. Vomiting occurs when stomach contents are thrown up and out of the mouth which puts you at an increased risk for dehydration. Older adults and people with other diseases or a weak immune system are at higher risk for dehydration. Drink clear fluids in small but frequent amounts as tolerated. Eat bland, easy-to-digest foods in small amounts as tolerated.     SEEK IMMEDIATE MEDICAL CARE IF YOU HAVE THE FOLLOWING SYMPTOMS: fever, inability to keep fluids down, black or bloody vomitus, black or bloody stools, lightheadedness/dizziness, chest pain, severe headache, rash, shortness of breath, cold or clammy skin, confusion, pain with urination, or any signs of dehydration.     Anxiety    Generalized anxiety disorder (TRISTON) is a mental disorder. It is defined as anxiety that is not necessarily related to specific events or activities or is out of proportion to said events. Symptoms include restlessness, fatigue, difficulty concentrations, irritability and difficulty concentrating. It interferes with life functions, including relationships, work, and school. If you were started on a medication make sure to take exactly as prescribed and follow up with a psychiatrist.    SEEK IMMEDIATE MEDICAL CARE IF YOU HAVE THE FOLLOWING SYMPTOMS: thoughts about hurting killing yourself, thoughts about hurting or killing somebody else, hallucinations, or worsening depression.

## 2023-06-14 NOTE — ED PROVIDER NOTE - CLINICAL SUMMARY MEDICAL DECISION MAKING FREE TEXT BOX
No distress.  Abdomen soft, non tender.  VSS.  Labs reassuring.  Feels better after meds.  Denies SI/HI and has his own psychiatrist.  DC home.  Strict return instructions discussed.

## 2023-06-14 NOTE — ED PROVIDER NOTE - OBJECTIVE STATEMENT
34 yo M with pmhx of anxiety presenting for evaluation of vomiting a small amount of blood. Patient is undergoing a lot of stress. Patient currently is not vomiting. Has no abdominal pain. No cp, sob, fever, chills, diarrhea, back pain, urinary symptoms, headache, dizziness, paresthesias, or weakness.

## 2023-06-14 NOTE — ED PROVIDER NOTE - PATIENT PORTAL LINK FT
You can access the FollowMyHealth Patient Portal offered by [facility] by registering at the following website: [email or web address]. By joining [employer]’s FollowMyHealth portal, you will also be able to view your health information using other applications (apps) compatible with our system.

## 2023-06-27 NOTE — ED PROVIDER NOTE - CONDITION AT DISCHARGE:
Improved Modified Advancement Flap Text: The defect edges were debeveled with a #15 scalpel blade.  Given the location of the defect, shape of the defect and the proximity to free margins a modified advancement flap was deemed most appropriate.  Using a sterile surgical marker, an appropriate advancement flap was drawn incorporating the defect and placing the expected incisions within the relaxed skin tension lines where possible.    The area thus outlined was incised deep to adipose tissue with a #15 scalpel blade.  The skin margins were undermined to an appropriate distance in all directions utilizing iris scissors.

## 2023-08-01 ENCOUNTER — EMERGENCY (EMERGENCY)
Facility: HOSPITAL | Age: 33
LOS: 0 days | Discharge: HOME | End: 2023-08-02
Attending: EMERGENCY MEDICINE
Payer: COMMERCIAL

## 2023-08-01 VITALS
DIASTOLIC BLOOD PRESSURE: 109 MMHG | SYSTOLIC BLOOD PRESSURE: 170 MMHG | TEMPERATURE: 98 F | OXYGEN SATURATION: 99 % | WEIGHT: 214.95 LBS | HEART RATE: 105 BPM

## 2023-08-01 DIAGNOSIS — F32.9 MAJOR DEPRESSIVE DISORDER, SINGLE EPISODE, UNSPECIFIED: ICD-10-CM

## 2023-08-01 DIAGNOSIS — D17.1 BENIGN LIPOMATOUS NEOPLASM OF SKIN AND SUBCUTANEOUS TISSUE OF TRUNK: Chronic | ICD-10-CM

## 2023-08-01 LAB
ALBUMIN SERPL ELPH-MCNC: 4.1 G/DL — SIGNIFICANT CHANGE UP (ref 3.5–5.2)
ALP SERPL-CCNC: 87 U/L — SIGNIFICANT CHANGE UP (ref 30–115)
ALT FLD-CCNC: 35 U/L — SIGNIFICANT CHANGE UP (ref 0–41)
ANION GAP SERPL CALC-SCNC: 10 MMOL/L — SIGNIFICANT CHANGE UP (ref 7–14)
APAP SERPL-MCNC: <5 UG/ML — LOW (ref 10–30)
AST SERPL-CCNC: 31 U/L — SIGNIFICANT CHANGE UP (ref 0–41)
BASOPHILS # BLD AUTO: 0.02 K/UL — SIGNIFICANT CHANGE UP (ref 0–0.2)
BASOPHILS NFR BLD AUTO: 0.2 % — SIGNIFICANT CHANGE UP (ref 0–1)
BILIRUB SERPL-MCNC: 0.5 MG/DL — SIGNIFICANT CHANGE UP (ref 0.2–1.2)
BUN SERPL-MCNC: 19 MG/DL — SIGNIFICANT CHANGE UP (ref 10–20)
CALCIUM SERPL-MCNC: 9.3 MG/DL — SIGNIFICANT CHANGE UP (ref 8.4–10.4)
CHLORIDE SERPL-SCNC: 102 MMOL/L — SIGNIFICANT CHANGE UP (ref 98–110)
CO2 SERPL-SCNC: 24 MMOL/L — SIGNIFICANT CHANGE UP (ref 17–32)
CREAT SERPL-MCNC: 1 MG/DL — SIGNIFICANT CHANGE UP (ref 0.7–1.5)
EGFR: 101 ML/MIN/1.73M2 — SIGNIFICANT CHANGE UP
EOSINOPHIL # BLD AUTO: 0.04 K/UL — SIGNIFICANT CHANGE UP (ref 0–0.7)
EOSINOPHIL NFR BLD AUTO: 0.4 % — SIGNIFICANT CHANGE UP (ref 0–8)
ETHANOL SERPL-MCNC: <10 MG/DL — SIGNIFICANT CHANGE UP
GLUCOSE SERPL-MCNC: 92 MG/DL — SIGNIFICANT CHANGE UP (ref 70–99)
HCT VFR BLD CALC: 51.3 % — SIGNIFICANT CHANGE UP (ref 42–52)
HGB BLD-MCNC: 17.1 G/DL — SIGNIFICANT CHANGE UP (ref 14–18)
IMM GRANULOCYTES NFR BLD AUTO: 0.3 % — SIGNIFICANT CHANGE UP (ref 0.1–0.3)
LYMPHOCYTES # BLD AUTO: 0.91 K/UL — LOW (ref 1.2–3.4)
LYMPHOCYTES # BLD AUTO: 9.4 % — LOW (ref 20.5–51.1)
MCHC RBC-ENTMCNC: 29.2 PG — SIGNIFICANT CHANGE UP (ref 27–31)
MCHC RBC-ENTMCNC: 33.3 G/DL — SIGNIFICANT CHANGE UP (ref 32–37)
MCV RBC AUTO: 87.7 FL — SIGNIFICANT CHANGE UP (ref 80–94)
MONOCYTES # BLD AUTO: 1.01 K/UL — HIGH (ref 0.1–0.6)
MONOCYTES NFR BLD AUTO: 10.5 % — HIGH (ref 1.7–9.3)
NEUTROPHILS # BLD AUTO: 7.63 K/UL — HIGH (ref 1.4–6.5)
NEUTROPHILS NFR BLD AUTO: 79.2 % — HIGH (ref 42.2–75.2)
NRBC # BLD: 0 /100 WBCS — SIGNIFICANT CHANGE UP (ref 0–0)
PLATELET # BLD AUTO: 173 K/UL — SIGNIFICANT CHANGE UP (ref 130–400)
PMV BLD: 10.6 FL — HIGH (ref 7.4–10.4)
POTASSIUM SERPL-MCNC: 3.9 MMOL/L — SIGNIFICANT CHANGE UP (ref 3.5–5)
POTASSIUM SERPL-SCNC: 3.9 MMOL/L — SIGNIFICANT CHANGE UP (ref 3.5–5)
PROT SERPL-MCNC: 6.5 G/DL — SIGNIFICANT CHANGE UP (ref 6–8)
RBC # BLD: 5.85 M/UL — SIGNIFICANT CHANGE UP (ref 4.7–6.1)
RBC # FLD: 13.3 % — SIGNIFICANT CHANGE UP (ref 11.5–14.5)
SALICYLATES SERPL-MCNC: <0.3 MG/DL — LOW (ref 4–30)
SODIUM SERPL-SCNC: 136 MMOL/L — SIGNIFICANT CHANGE UP (ref 135–146)
WBC # BLD: 9.64 K/UL — SIGNIFICANT CHANGE UP (ref 4.8–10.8)
WBC # FLD AUTO: 9.64 K/UL — SIGNIFICANT CHANGE UP (ref 4.8–10.8)

## 2023-08-01 PROCEDURE — 90792 PSYCH DIAG EVAL W/MED SRVCS: CPT | Mod: 95

## 2023-08-01 PROCEDURE — 93010 ELECTROCARDIOGRAM REPORT: CPT

## 2023-08-01 PROCEDURE — 80053 COMPREHEN METABOLIC PANEL: CPT

## 2023-08-01 PROCEDURE — 80307 DRUG TEST PRSMV CHEM ANLYZR: CPT

## 2023-08-01 PROCEDURE — 93005 ELECTROCARDIOGRAM TRACING: CPT

## 2023-08-01 PROCEDURE — 36415 COLL VENOUS BLD VENIPUNCTURE: CPT

## 2023-08-01 PROCEDURE — 99285 EMERGENCY DEPT VISIT HI MDM: CPT

## 2023-08-01 PROCEDURE — 85025 COMPLETE CBC W/AUTO DIFF WBC: CPT

## 2023-08-01 RX ORDER — HYDROXYZINE HCL 10 MG
25 TABLET ORAL ONCE
Refills: 0 | Status: COMPLETED | OUTPATIENT
Start: 2023-08-01 | End: 2023-08-01

## 2023-08-01 RX ADMIN — Medication 25 MILLIGRAM(S): at 20:12

## 2023-08-01 NOTE — ED BEHAVIORAL HEALTH ASSESSMENT NOTE - SUMMARY
34 yo male, currently domiciled at home with family, currently employed with pphx of depression/anxiety and no pmh that presented due to SI in the setting of stressors. To note patient has no IP hospitalization, no previous SA, no previous self harm, no legal/violence hx, no substance use hx.      Patient has numerous high risk factors at this time, including financial, romantic, and family stressors. Patient also with family hx of completed suicide. Patient reports having SI with thoughts recently of easiest way to kill self. He denies any plan or preparation. Patient is very dysphoric on exam. Patient has no outpatient treatment at this time (reports last therapist seen months ago). Patient reports sending multiple messages to family members over the past week reporting plans to kill self. Although patient reports no intent to kill himself at this time, patient is at an acute risk for self harm as patient reports often feeling overwhelmed and then having the thoughts of SI where he thinks of easiest way to kill himself. At this time patient is at significant acute risk of harm to self and meets involuntary hospitalization at this time as this is the least restrictive means of treatment of his mental health condition at this time.      Plan:    -Admit INVOL, awaiting bed   -Continue Home Medication which includes:Xanax 1mg qdaily prn    -PRNs: Haldol 5/Ativan 2/Benadryl 50mg q6hr prn severe agitation; Hydroxyzine 25mg q6hr prn anxiety; monitor QTc, monitor for sedation, attempt verbal redirection   -Labs:   ---CBC: unremarkable   ---CMP: unremarkable   ---EKG: completed   ---UDS: unremarkable   ---BAL: neg   -COVID PCR-need COVID

## 2023-08-01 NOTE — ED PROVIDER NOTE - CLINICAL SUMMARY MEDICAL DECISION MAKING FREE TEXT BOX
ccruz- pt signed out to me by Dr Daniel pending tele psych rec's. Dr Torres from telepsych recommends discharge home at this time after he spoke with patients wife. per Dr Torres, wife was able to set up an appt tonight with a therapist. Patient denies SI At this time. calm cooperative. has no complaints. eager for discharge home. strict return precautions

## 2023-08-01 NOTE — ED PROVIDER NOTE - PROGRESS NOTE DETAILS
EK - spoke to telepsych, who recommend involuntary admission for pt given high risk for suicide. EK - Pt and family extremely upset and resistant to IPP.  Security called to bedside bc mother insisted psych told her pt was cleared to go home and demanded his clothes.  Pt and family also providing inconsistent information about when he last met with his therapist, initially saying he had not spoken to her in months, now saying 2-3wks ago and further that he made an appointment for tomorrow.  At pt/family request, I called telepsych to provide the information about the therapy appointment and request that psychiatrist speak with him again to confirm plan and answer questions.  Pt remains on 1:1 at this time as involuntary hold.    Explained to pt that he needs to stay based on current psych assessment and will be held in the ED pending bed availability.  Family and pt requesting to 0 EP: Patient endorsed to me this morning to follow-up telepsychiatry reevaluation and dispo.  Patient resting in stretcher, does not appear to be in acute distress, questioning why he is being held in the ED.  Reason for 1:1 and ED hold was explained to patient in detail.  He is waiting for psychiatry reevaluation.  Currently feeling anxious, review of records shows that patient takes Xanax 1 mg tablet as needed, a dose was ordered.  Continue observation.    His therapist is Terrence Burroughs [phone] ( info obtained by the charge nurse, Duong, from pt's mother). EP: Patient endorsed to Dr. Vargas to follow-up psychiatry consult and dispo.

## 2023-08-01 NOTE — ED ADULT TRIAGE NOTE - CHIEF COMPLAINT QUOTE
Patient brought in by mother after he admitted to wanting to kill himself. Patient states he has lost his father who and left him in debt and it has just become to unbearable. patient admits to having plan but does not share it in triage

## 2023-08-01 NOTE — ED BEHAVIORAL HEALTH ASSESSMENT NOTE - NSSUICRSKFACTOR_PSY_ALL_CORE
Current and Past Psychiatric Diagnoses/Presenting Symptoms/Treatment Related Factors/Activating Events/Stressors Current and Past Psychiatric Diagnoses/Presenting Symptoms/Historical Factors/Treatment Related Factors/Activating Events/Stressors

## 2023-08-01 NOTE — ED PROVIDER NOTE - OBJECTIVE STATEMENT
33yM on alprazolam p/w worsening depression and suicidal ideation in the setting of sig stress (family death, financial hardship - both bills and work issues, marital and other relationship difficulties).  Pt has recently made several statements about wishing he were dead or threatening to kill himself, increasing in frequency today until his mother brought him to the ED.  No HI or AVH.  Not currently on any meds 2/2 loss of health insurance.

## 2023-08-01 NOTE — ED BEHAVIORAL HEALTH ASSESSMENT NOTE - NSPRESENTSXS_PSY_ALL_CORE
Depressed mood/Anhedonia Depressed mood/Anhedonia/Hopelessness or despair/Severe anxiety, agitation or panic
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
## 2023-08-01 NOTE — ED BEHAVIORAL HEALTH NOTE - BEHAVIORAL HEALTH NOTE
===================     PRE-HOSPITAL COURSE     ===================     SOURCE: MD and secondhand ED documentation      DETAILS:  BIB mom via car     ============     ED COURSE     ============     SOURCE: MD and secondhand ED documentation      ARRIVAL: BIB Mom via car     BELONGINGS: No belongings of note. All belongings were provided to hospital security, and patient currently in a gown with a 1:1 staff member.     BEHAVIOR: Per MD pt has been calm, cooperative and in behavioral control. MD reports pt is presenting due to passive SI, worsening depression and increase stressors (ie financial, marital). MD report pt is minimizing symptoms and guarded at times. MD reports pt is tearful, disorganized with thoughts requiring redirecting. MD reports pt is AOx4, and presents well-kept.      TREATMENT: Per chart & MD pt was given Atarax 25 mg PO @ 19:46     VISITORS: Mom was presenting in the ED.

## 2023-08-01 NOTE — ED BEHAVIORAL HEALTH ASSESSMENT NOTE - NSACTIVEVENT_PSY_ALL_CORE
Financial/Triggering events leading to humiliation, shame, and/or despair (e.g., Loss of relationship, financial or health status) (real or anticipated)

## 2023-08-01 NOTE — ED PROVIDER NOTE - PHYSICAL EXAMINATION
CONSTITUTIONAL: well developed; well nourished; well appearing in no acute distress  HEAD: normocephalic; atraumatic  EYES: no conjunctival injection, no scleral icterus  ENT: no nasal discharge; airway clear.  NECK: supple; non tender. + full passive ROM in all directions  CARD: tachycardic, warm and well perfused  RESP: breathing comfortably on RA, speaking in full sentences w/o distress  EXT: moving all extremities spontaneously, normal ROM. No clubbing, cyanosis or edema  SKIN: warm and dry, no lesions noted  NEURO: alert, oriented, CN II-XII grossly intact, motor and sensory grossly intact, speech nonslurred, no focal deficits. GCS 15  PSYCH: tearful and distressed, cooperative, good eye contact, logical thought process, +SI

## 2023-08-01 NOTE — ED BEHAVIORAL HEALTH ASSESSMENT NOTE - OTHER PAST PSYCHIATRIC HISTORY (INCLUDE DETAILS REGARDING ONSET, COURSE OF ILLNESS, INPATIENT/OUTPATIENT TREATMENT)
Previous Dx: Depression     Previous Med Trials:Alprazlam 1mg (max 3mg day)     Previous IP treatment: denies     Previous SA: denies     Self harming: denies     Current MH treatment: previously with therapist but reports not seeing with the month ([facility])     Violence/Legal: denies
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
## 2023-08-01 NOTE — ED BEHAVIORAL HEALTH ASSESSMENT NOTE - RISK ASSESSMENT
RF: family hx of suicide, financial stressor, relationship stressor, recent SI, no outpatient treatment, PTSD, cannabis use  PF: supportive family, children, no previous SA, self harm  RM: Admit

## 2023-08-01 NOTE — ED PROVIDER NOTE - PATIENT PORTAL LINK FT
You can access the FollowMyHealth Patient Portal offered by [facility] by registering at the following website: [email or web address]. By joining [employer]’s FollowMyHealth portal, you will also be able to view your health information using other applications (apps) compatible with our system.
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
## 2023-08-02 VITALS
OXYGEN SATURATION: 99 % | SYSTOLIC BLOOD PRESSURE: 130 MMHG | TEMPERATURE: 97 F | HEART RATE: 90 BPM | DIASTOLIC BLOOD PRESSURE: 70 MMHG | RESPIRATION RATE: 18 BRPM

## 2023-08-02 PROCEDURE — 99215 OFFICE O/P EST HI 40 MIN: CPT | Mod: 95

## 2023-08-02 RX ORDER — ALPRAZOLAM 0.25 MG
1 TABLET ORAL ONCE
Refills: 0 | Status: DISCONTINUED | OUTPATIENT
Start: 2023-08-02 | End: 2023-08-02

## 2023-08-02 RX ADMIN — Medication 1 MILLIGRAM(S): at 09:47

## 2023-08-02 RX ADMIN — Medication 1 MILLIGRAM(S): at 12:35

## 2023-08-02 NOTE — ED BEHAVIORAL HEALTH PROGRESS NOTE - SUMMARY
RF: family hx of suicide, financial stressor, relationship stressor, recent SI, no outpatient treatment, PTSD, cannabis use  PF: supportive family, children, no previous SA, self harm  RM: Admit  Low   Moderate Acute Suicide Risk   Yes

## 2023-08-02 NOTE — ED ADULT NURSE REASSESSMENT NOTE - DESCRIPTION
sitter at bedside pt reports he is upset and wants to be discharged pt has  pressured speech appears anxious

## 2023-08-02 NOTE — ED ADULT NURSE REASSESSMENT NOTE - NS ED NURSE REASSESS COMMENT FT1
Pt assessed. 1:1 constant observation maintained for safety. Pt resting comfortably in bed at this time. Safety and comfort measures rendered.

## 2023-08-02 NOTE — ED BEHAVIORAL HEALTH PROGRESS NOTE - DETAILS:
On interview this morning, patient remains well-related, very polite and cooperative, and forthcoming. He relates strongly that he is confused he was kept here over two days against his will. Absolutely denies any wish to hurt himself or any wish to die. Reports he has two young children to live for, so he would never harm himself, never considered doing that, and in the past never took steps toward harming himself. He also reports having urgency to return to work. Patient reports he came with his mother because he had chest palpitations and panic symptoms, and felt briefly overwhelmed, and thought he would be able to talk to someone here for relief. Reports he has a therapist and plans to make an appointment for later this week. When questioned about any previous suicidal comments, he says he said he only said "I can't do this anymore," because he felt frustrated, and he did not mean he wanted to kill himself. I spoke to his mother Tayler cervantes [phone] who corroborates that patient only said exactly this statement as well as "I don't want to be here" but nothing more specifically about wanting to harm himself. She denies that he sent any kind of text message with a suicidal tone. She feels he is fine now and doesn't have acute safety concerns, but defers further input to patient's wife.     On interview this morning, patient remains well-related, very polite and cooperative, and forthcoming. He relates strongly that he is confused he was kept here over two days against his will. Absolutely denies any wish to hurt himself or any wish to die. Reports he has two young children to live for, so he would never harm himself, never considered doing that, and in the past never took steps toward harming himself. He also reports having urgency to return to work. Patient reports he came with his mother because he had chest palpitations and panic symptoms, and felt briefly overwhelmed, and thought he would be able to talk to someone here for relief. Reports he has a therapist and plans to make an appointment for later this week. When questioned about any previous suicidal comments, he says he said he only said "I can't do this anymore," because he felt frustrated, and he did not mean he wanted to kill himself.     I spoke to his mother Tayler cervantes [phone] who corroborates that patient only said exactly this statement as well as "I don't want to be here" but nothing more specifically about wanting to harm himself. She denies that he sent any kind of text message with a suicidal tone. She also corroborates that she brought patient in because he seemed to be having a panic attack and she was worried about his chest pain. She feels he is fine now and doesn't have acute safety concerns, but defers further input to patient's wife. On interview this morning, patient remains well-related, very polite and cooperative, and forthcoming. He relates strongly that he is confused he was kept here over two days against his will. Absolutely denies any wish to hurt himself or any wish to die. Reports he has two young children to live for, so he would never harm himself, never considered doing that, and in the past never took steps toward harming himself. He also reports having urgency to return to work. Patient reports he came with his mother because he had chest palpitations and panic symptoms, and felt briefly overwhelmed, and thought he would be able to talk to someone here for relief. Reports he has a therapist and plans to make an appointment for later this week. When questioned about any previous suicidal comments, he says he said he only said "I can't do this anymore," because he felt frustrated, and he did not mean he wanted to kill himself.     I spoke to his mother Tayler again [phone] who corroborates that patient only said exactly this statement as well as "I don't want to be here" but nothing more specifically about wanting to harm himself. She denies that he sent any kind of text message with a suicidal tone. She also corroborates that she brought patient in because he seemed to be having a panic attack and she was worried about his chest pain. She feels he is fine now and doesn't have acute safety concerns, but defers further input to patient's wife.    I spoke subsequently w/ patient's wife Viridiana [phone]. Wife's main concern was that patient has had chronic affective instability (rapid intense mood shifts) for many years since she has known him. Denies any acute change. Says he had an outburst yesterday, was difficult to calm, and so she is worried this might happen again but otherwise doesn't have acute safety concerns, no concerns about SI. Thinks patient should be on different med regimen and asks about possibility of "bipolar disorder." Wife then came to hospital to speak to and see patient, and subsequently tells me she feels he is much better now, back to his baseline, and feels it would be safe for him to return home and that hospitalization would not be helpful. She also confirms making a f/u appointment w/ patient's therapist for later tonight 8pm. She was provided w/ psychoeducation and instructions to bring pt back if any new safety concerns or significant changes.

## 2023-08-02 NOTE — ED BEHAVIORAL HEALTH PROGRESS NOTE - CASE SUMMARY/FORMULATION (CLEARLY DOCUMENT RATIONALE FOR DISPOSITION CHANGE)
This is a 32 yo male domiciled with his wife and two kids, employed, with hx of depression and anxiety, currently in outpatient therapy and prescribed Lexapro and Xanax, no prior psychiatric hospitalizations, no previous suicide attempts or self-harm, who presents accompanied by his mother due to acute anxiety and potential SI in the context of being overwhelmed with stressors. Patient reports being under increased stressed recently, mostly due to financial stress. He reports being triggered by receiving bills yesterday, and felt overwhelmed and started having chest palpitations. Patient in the ED has been consistently denying he wants to hurt himself because he has two kids, and he says he was expressing his frustration when telling his mother about "not wanting to be here" or about "not being able to do this anymore." He had no specific plan or wish to harm himself. Currently is focused on returning to work and caring for his family. He also says he expects his mother to stay with him the next few days given his recent emotional distress, and he plans to make an appointment with his therapist this week. His mother corroborates that she was concerned mainly due to his apparent panic attack and chest pain/palpitations, in the context of increased anxiety and lability in the past two days. She agrees he appears improved now and does not have any acute safety concerns. Patient on exam this morning is calm, talkative and forthcoming, in no acute emotional distress. He expresses surprise that he was held against his will, stating he only intended to speak to someone and get relief for his anxiety, which he says is now better. Patient adamantly denies any SI now.    While patient seems to have numerous chronic elevated risk factors such as male gender, hx of suicide in family, recent financial stress, in line with previous assessment, it does appear that what prompted his presentation was the combination of acute frustration and anxiety which led to vague comments about not wanting to be present and not wanting to face stressors. Yet he has demonstrated ability to maintain impulses as he has no history of suicide attempts or self-harm and adamantly denies SI now. He has consistently been denying wish to harm himself since presenting to the ED. He also reports very strong motivation to live because of his two young children. Patient denies wish to be hospitalized. At this time given all of the above, he does not present any imminent danger and there is no clear indication to hold or hospitalize the patient against his will. This is a 34 yo male domiciled with his wife and two kids, employed, with hx of depression and anxiety, currently in outpatient therapy and prescribed Lexapro and Xanax, no prior psychiatric hospitalizations, no previous suicide attempts or self-harm, who presents accompanied by his mother due to acute anxiety and potential SI in the context of being overwhelmed with stressors. Patient reports being under increased stressed recently, mostly due to financial stress. He reports being triggered by receiving bills yesterday, and felt overwhelmed and started having chest palpitations. Patient in the ED has been consistently denying he wants to hurt himself because he has two kids, and he says he was expressing his frustration when telling his mother about "not wanting to be here" or about "not being able to do this anymore." He had no specific plan or wish to harm himself. Currently is focused on returning to work and caring for his family. He also says he expects his mother to stay with him the next few days given his recent emotional distress, and he plans to make an appointment with his therapist this week. His mother corroborates that she was concerned mainly due to his apparent panic attack and chest pain/palpitations, in the context of increased anxiety and lability in the past two days. She agrees he appears improved now and does not have any acute safety concerns. Patient on exam this morning is calm, talkative and forthcoming, in no acute emotional distress. He expresses surprise that he was held against his will, stating he only intended to speak to someone and get relief for his anxiety, which he says is now better. Patient adamantly denies any SI now.    While patient seems to have numerous chronic elevated risk factors such as male gender, hx of suicide in family, recent financial stress, in line with previous assessment, it does appear that what prompted his presentation was the combination of acute frustration and anxiety which led to vague comments about not wanting to be present and not wanting to face stressors. Yet he has demonstrated ability to maintain impulses as he has no history of suicide attempts or self-harm and adamantly denies SI now. He has consistently been denying wish to harm himself since presenting to the ED. He also reports very strong motivation to live because of his two young children. Patient denies wish to be hospitalized. At this time given all of the above, he does not present any imminent danger and there is no clear indication to hold or hospitalize the patient against his will.    Plan to speak to his wife to confirm... This is a 34 yo male domiciled with his wife and two kids, employed, with hx of depression and anxiety, currently in outpatient therapy and prescribed Lexapro and Xanax, no prior psychiatric hospitalizations, no previous suicide attempts or self-harm, who presents accompanied by his mother due to acute anxiety and potential SI in the context of being overwhelmed with stressors. Patient reports being under increased stressed recently, mostly due to financial stress. He reports being triggered by receiving bills yesterday, and felt overwhelmed and started having chest palpitations. Patient in the ED has been consistently denying he wants to hurt himself because he has two kids, and he says he was expressing his frustration when telling his mother about "not wanting to be here" or about "not being able to do this anymore." He had no specific plan or wish to harm himself. Currently is focused on returning to work and caring for his family. He also says he expects his mother to stay with him the next few days given his recent emotional distress, and he plans to make an appointment with his therapist this week. His mother corroborates that she was concerned mainly due to his apparent panic attack and chest pain/palpitations, in the context of increased anxiety and lability in the past two days. She agrees he appears improved now and does not have any acute safety concerns. Patient on exam this morning is calm, talkative and forthcoming, in no acute emotional distress. He expresses surprise that he was held against his will, stating he only intended to speak to someone and get relief for his anxiety, which he says is now better. Patient adamantly denies any SI now.    While patient seems to have numerous chronic elevated risk factors such as male gender, hx of suicide in family, recent financial stress, in line with previous assessment, it does appear that what prompted his presentation was the combination of acute frustration and anxiety which led to vague comments about not wanting to be present and not wanting to face stressors. Yet he has demonstrated ability to maintain impulses as he has no history of suicide attempts or self-harm and adamantly denies SI now. He has consistently been denying wish to harm himself since presenting to the ED. He also reports very strong motivation to live because of his two young children. Patient denies wish to be hospitalized. At this time given all of the above, he does not present any imminent danger and there is no clear indication to hold or hospitalize the patient against his will.    See interval hx for further details/reasoning of disposition change to discharge. Agree with family that chronic Xanax likely not ideal for him, but he will have to f/u with outpatient psychiatrist.    Plan:  - psychiatrically cleared for discharge. Plan d/w patient's wife and pt's mother. He has f/u with his therapist later tonight, confirmed by wife.

## 2023-08-04 DIAGNOSIS — F32.9 MAJOR DEPRESSIVE DISORDER, SINGLE EPISODE, UNSPECIFIED: ICD-10-CM

## 2023-08-04 DIAGNOSIS — F32.A DEPRESSION, UNSPECIFIED: ICD-10-CM

## 2023-08-04 DIAGNOSIS — F43.9 REACTION TO SEVERE STRESS, UNSPECIFIED: ICD-10-CM

## 2023-08-04 DIAGNOSIS — R45.851 SUICIDAL IDEATIONS: ICD-10-CM

## 2023-08-25 NOTE — ED PROVIDER NOTE - NSFOLLOWUPCLINICSTOKEN_GEN_ALL_ED_FT
Acitretin Pregnancy And Lactation Text: This medication is Pregnancy Category X and should not be given to women who are pregnant or may become pregnant in the future. This medication is excreted in breast milk. 295083:1-3 Days|| ||00\01||False;

## 2023-11-07 ENCOUNTER — INPATIENT (INPATIENT)
Facility: HOSPITAL | Age: 33
LOS: 7 days | Discharge: HOME | DRG: 881 | End: 2023-11-15
Attending: PSYCHIATRY & NEUROLOGY | Admitting: PSYCHIATRY & NEUROLOGY
Payer: COMMERCIAL

## 2023-11-07 VITALS
OXYGEN SATURATION: 99 % | SYSTOLIC BLOOD PRESSURE: 153 MMHG | RESPIRATION RATE: 18 BRPM | TEMPERATURE: 97 F | HEART RATE: 89 BPM | DIASTOLIC BLOOD PRESSURE: 90 MMHG

## 2023-11-07 DIAGNOSIS — D17.1 BENIGN LIPOMATOUS NEOPLASM OF SKIN AND SUBCUTANEOUS TISSUE OF TRUNK: Chronic | ICD-10-CM

## 2023-11-07 LAB
ALBUMIN SERPL ELPH-MCNC: 4.3 G/DL — SIGNIFICANT CHANGE UP (ref 3.5–5.2)
ALBUMIN SERPL ELPH-MCNC: 4.3 G/DL — SIGNIFICANT CHANGE UP (ref 3.5–5.2)
ALP SERPL-CCNC: 70 U/L — SIGNIFICANT CHANGE UP (ref 30–115)
ALP SERPL-CCNC: 70 U/L — SIGNIFICANT CHANGE UP (ref 30–115)
ALT FLD-CCNC: 37 U/L — SIGNIFICANT CHANGE UP (ref 0–41)
ALT FLD-CCNC: 37 U/L — SIGNIFICANT CHANGE UP (ref 0–41)
AMPHET UR-MCNC: NEGATIVE — SIGNIFICANT CHANGE UP
AMPHET UR-MCNC: NEGATIVE — SIGNIFICANT CHANGE UP
ANION GAP SERPL CALC-SCNC: 13 MMOL/L — SIGNIFICANT CHANGE UP (ref 7–14)
ANION GAP SERPL CALC-SCNC: 13 MMOL/L — SIGNIFICANT CHANGE UP (ref 7–14)
APAP SERPL-MCNC: 38 UG/ML — HIGH (ref 10–30)
APAP SERPL-MCNC: 38 UG/ML — HIGH (ref 10–30)
AST SERPL-CCNC: 24 U/L — SIGNIFICANT CHANGE UP (ref 0–41)
AST SERPL-CCNC: 24 U/L — SIGNIFICANT CHANGE UP (ref 0–41)
BARBITURATES UR SCN-MCNC: NEGATIVE — SIGNIFICANT CHANGE UP
BARBITURATES UR SCN-MCNC: NEGATIVE — SIGNIFICANT CHANGE UP
BASOPHILS # BLD AUTO: 0 K/UL — SIGNIFICANT CHANGE UP (ref 0–0.2)
BASOPHILS # BLD AUTO: 0 K/UL — SIGNIFICANT CHANGE UP (ref 0–0.2)
BASOPHILS NFR BLD AUTO: 0 % — SIGNIFICANT CHANGE UP (ref 0–1)
BASOPHILS NFR BLD AUTO: 0 % — SIGNIFICANT CHANGE UP (ref 0–1)
BENZODIAZ UR-MCNC: POSITIVE
BENZODIAZ UR-MCNC: POSITIVE
BILIRUB SERPL-MCNC: 0.6 MG/DL — SIGNIFICANT CHANGE UP (ref 0.2–1.2)
BILIRUB SERPL-MCNC: 0.6 MG/DL — SIGNIFICANT CHANGE UP (ref 0.2–1.2)
BUN SERPL-MCNC: 18 MG/DL — SIGNIFICANT CHANGE UP (ref 10–20)
BUN SERPL-MCNC: 18 MG/DL — SIGNIFICANT CHANGE UP (ref 10–20)
CALCIUM SERPL-MCNC: 9.3 MG/DL — SIGNIFICANT CHANGE UP (ref 8.4–10.4)
CALCIUM SERPL-MCNC: 9.3 MG/DL — SIGNIFICANT CHANGE UP (ref 8.4–10.4)
CHLORIDE SERPL-SCNC: 105 MMOL/L — SIGNIFICANT CHANGE UP (ref 98–110)
CHLORIDE SERPL-SCNC: 105 MMOL/L — SIGNIFICANT CHANGE UP (ref 98–110)
CK SERPL-CCNC: 520 U/L — HIGH (ref 0–225)
CK SERPL-CCNC: 520 U/L — HIGH (ref 0–225)
CO2 SERPL-SCNC: 23 MMOL/L — SIGNIFICANT CHANGE UP (ref 17–32)
CO2 SERPL-SCNC: 23 MMOL/L — SIGNIFICANT CHANGE UP (ref 17–32)
COCAINE METAB.OTHER UR-MCNC: POSITIVE
COCAINE METAB.OTHER UR-MCNC: POSITIVE
CREAT SERPL-MCNC: 0.9 MG/DL — SIGNIFICANT CHANGE UP (ref 0.7–1.5)
CREAT SERPL-MCNC: 0.9 MG/DL — SIGNIFICANT CHANGE UP (ref 0.7–1.5)
DRUG SCREEN 1, URINE RESULT: SIGNIFICANT CHANGE UP
DRUG SCREEN 1, URINE RESULT: SIGNIFICANT CHANGE UP
EGFR: 114 ML/MIN/1.73M2 — SIGNIFICANT CHANGE UP
EGFR: 114 ML/MIN/1.73M2 — SIGNIFICANT CHANGE UP
EOSINOPHIL # BLD AUTO: 0 K/UL — SIGNIFICANT CHANGE UP (ref 0–0.7)
EOSINOPHIL # BLD AUTO: 0 K/UL — SIGNIFICANT CHANGE UP (ref 0–0.7)
EOSINOPHIL NFR BLD AUTO: 0 % — SIGNIFICANT CHANGE UP (ref 0–8)
EOSINOPHIL NFR BLD AUTO: 0 % — SIGNIFICANT CHANGE UP (ref 0–8)
ETHANOL SERPL-MCNC: <10 MG/DL — SIGNIFICANT CHANGE UP
ETHANOL SERPL-MCNC: <10 MG/DL — SIGNIFICANT CHANGE UP
FENTANYL UR QL: NEGATIVE — SIGNIFICANT CHANGE UP
FENTANYL UR QL: NEGATIVE — SIGNIFICANT CHANGE UP
GLUCOSE SERPL-MCNC: 124 MG/DL — HIGH (ref 70–99)
GLUCOSE SERPL-MCNC: 124 MG/DL — HIGH (ref 70–99)
HCT VFR BLD CALC: 46.7 % — SIGNIFICANT CHANGE UP (ref 42–52)
HCT VFR BLD CALC: 46.7 % — SIGNIFICANT CHANGE UP (ref 42–52)
HGB BLD-MCNC: 15.5 G/DL — SIGNIFICANT CHANGE UP (ref 14–18)
HGB BLD-MCNC: 15.5 G/DL — SIGNIFICANT CHANGE UP (ref 14–18)
LYMPHOCYTES # BLD AUTO: 0.41 K/UL — LOW (ref 1.2–3.4)
LYMPHOCYTES # BLD AUTO: 0.41 K/UL — LOW (ref 1.2–3.4)
LYMPHOCYTES # BLD AUTO: 3 % — LOW (ref 20.5–51.1)
LYMPHOCYTES # BLD AUTO: 3 % — LOW (ref 20.5–51.1)
MANUAL SMEAR VERIFICATION: SIGNIFICANT CHANGE UP
MANUAL SMEAR VERIFICATION: SIGNIFICANT CHANGE UP
MCHC RBC-ENTMCNC: 29.5 PG — SIGNIFICANT CHANGE UP (ref 27–31)
MCHC RBC-ENTMCNC: 29.5 PG — SIGNIFICANT CHANGE UP (ref 27–31)
MCHC RBC-ENTMCNC: 33.2 G/DL — SIGNIFICANT CHANGE UP (ref 32–37)
MCHC RBC-ENTMCNC: 33.2 G/DL — SIGNIFICANT CHANGE UP (ref 32–37)
MCV RBC AUTO: 88.8 FL — SIGNIFICANT CHANGE UP (ref 80–94)
MCV RBC AUTO: 88.8 FL — SIGNIFICANT CHANGE UP (ref 80–94)
METHADONE UR-MCNC: NEGATIVE — SIGNIFICANT CHANGE UP
METHADONE UR-MCNC: NEGATIVE — SIGNIFICANT CHANGE UP
MONOCYTES # BLD AUTO: 0.55 K/UL — SIGNIFICANT CHANGE UP (ref 0.1–0.6)
MONOCYTES # BLD AUTO: 0.55 K/UL — SIGNIFICANT CHANGE UP (ref 0.1–0.6)
MONOCYTES NFR BLD AUTO: 4 % — SIGNIFICANT CHANGE UP (ref 1.7–9.3)
MONOCYTES NFR BLD AUTO: 4 % — SIGNIFICANT CHANGE UP (ref 1.7–9.3)
NEUTROPHILS # BLD AUTO: 12.74 K/UL — HIGH (ref 1.4–6.5)
NEUTROPHILS # BLD AUTO: 12.74 K/UL — HIGH (ref 1.4–6.5)
NEUTROPHILS NFR BLD AUTO: 93 % — HIGH (ref 42.2–75.2)
NEUTROPHILS NFR BLD AUTO: 93 % — HIGH (ref 42.2–75.2)
NRBC # BLD: 0 /100 — SIGNIFICANT CHANGE UP (ref 0–0)
NRBC # BLD: 0 /100 — SIGNIFICANT CHANGE UP (ref 0–0)
NRBC # BLD: SIGNIFICANT CHANGE UP /100 WBCS (ref 0–0)
NRBC # BLD: SIGNIFICANT CHANGE UP /100 WBCS (ref 0–0)
OPIATES UR-MCNC: NEGATIVE — SIGNIFICANT CHANGE UP
OPIATES UR-MCNC: NEGATIVE — SIGNIFICANT CHANGE UP
OXYCODONE UR-MCNC: NEGATIVE — SIGNIFICANT CHANGE UP
OXYCODONE UR-MCNC: NEGATIVE — SIGNIFICANT CHANGE UP
PCP UR-MCNC: NEGATIVE — SIGNIFICANT CHANGE UP
PCP UR-MCNC: NEGATIVE — SIGNIFICANT CHANGE UP
PLAT MORPH BLD: NORMAL — SIGNIFICANT CHANGE UP
PLAT MORPH BLD: NORMAL — SIGNIFICANT CHANGE UP
PLATELET # BLD AUTO: 147 K/UL — SIGNIFICANT CHANGE UP (ref 130–400)
PLATELET # BLD AUTO: 147 K/UL — SIGNIFICANT CHANGE UP (ref 130–400)
PMV BLD: 10.4 FL — SIGNIFICANT CHANGE UP (ref 7.4–10.4)
PMV BLD: 10.4 FL — SIGNIFICANT CHANGE UP (ref 7.4–10.4)
POTASSIUM SERPL-MCNC: 3.6 MMOL/L — SIGNIFICANT CHANGE UP (ref 3.5–5)
POTASSIUM SERPL-MCNC: 3.6 MMOL/L — SIGNIFICANT CHANGE UP (ref 3.5–5)
POTASSIUM SERPL-SCNC: 3.6 MMOL/L — SIGNIFICANT CHANGE UP (ref 3.5–5)
POTASSIUM SERPL-SCNC: 3.6 MMOL/L — SIGNIFICANT CHANGE UP (ref 3.5–5)
PROPOXYPHENE QUALITATIVE URINE RESULT: NEGATIVE — SIGNIFICANT CHANGE UP
PROPOXYPHENE QUALITATIVE URINE RESULT: NEGATIVE — SIGNIFICANT CHANGE UP
PROT SERPL-MCNC: 6.4 G/DL — SIGNIFICANT CHANGE UP (ref 6–8)
PROT SERPL-MCNC: 6.4 G/DL — SIGNIFICANT CHANGE UP (ref 6–8)
RBC # BLD: 5.26 M/UL — SIGNIFICANT CHANGE UP (ref 4.7–6.1)
RBC # BLD: 5.26 M/UL — SIGNIFICANT CHANGE UP (ref 4.7–6.1)
RBC # FLD: 12.6 % — SIGNIFICANT CHANGE UP (ref 11.5–14.5)
RBC # FLD: 12.6 % — SIGNIFICANT CHANGE UP (ref 11.5–14.5)
RBC BLD AUTO: NORMAL — SIGNIFICANT CHANGE UP
RBC BLD AUTO: NORMAL — SIGNIFICANT CHANGE UP
SALICYLATES SERPL-MCNC: <0.3 MG/DL — LOW (ref 4–30)
SALICYLATES SERPL-MCNC: <0.3 MG/DL — LOW (ref 4–30)
SARS-COV-2 RNA SPEC QL NAA+PROBE: SIGNIFICANT CHANGE UP
SARS-COV-2 RNA SPEC QL NAA+PROBE: SIGNIFICANT CHANGE UP
SODIUM SERPL-SCNC: 141 MMOL/L — SIGNIFICANT CHANGE UP (ref 135–146)
SODIUM SERPL-SCNC: 141 MMOL/L — SIGNIFICANT CHANGE UP (ref 135–146)
THC UR QL: POSITIVE
THC UR QL: POSITIVE
WBC # BLD: 13.7 K/UL — HIGH (ref 4.8–10.8)
WBC # BLD: 13.7 K/UL — HIGH (ref 4.8–10.8)
WBC # FLD AUTO: 13.7 K/UL — HIGH (ref 4.8–10.8)
WBC # FLD AUTO: 13.7 K/UL — HIGH (ref 4.8–10.8)

## 2023-11-07 PROCEDURE — 99223 1ST HOSP IP/OBS HIGH 75: CPT

## 2023-11-07 PROCEDURE — 70450 CT HEAD/BRAIN W/O DYE: CPT | Mod: 26,MA

## 2023-11-07 RX ORDER — ACETYLCYSTEINE 200 MG/ML
15 VIAL (ML) MISCELLANEOUS ONCE
Refills: 0 | Status: DISCONTINUED | OUTPATIENT
Start: 2023-11-07 | End: 2023-11-07

## 2023-11-07 RX ORDER — TETANUS TOXOID, REDUCED DIPHTHERIA TOXOID AND ACELLULAR PERTUSSIS VACCINE, ADSORBED 5; 2.5; 8; 8; 2.5 [IU]/.5ML; [IU]/.5ML; UG/.5ML; UG/.5ML; UG/.5ML
0.5 SUSPENSION INTRAMUSCULAR ONCE
Refills: 0 | Status: COMPLETED | OUTPATIENT
Start: 2023-11-07 | End: 2023-11-07

## 2023-11-07 RX ORDER — ACETAMINOPHEN 500 MG
650 TABLET ORAL EVERY 6 HOURS
Refills: 0 | Status: DISCONTINUED | OUTPATIENT
Start: 2023-11-07 | End: 2023-11-15

## 2023-11-07 RX ORDER — SODIUM CHLORIDE 9 MG/ML
1000 INJECTION, SOLUTION INTRAVENOUS ONCE
Refills: 0 | Status: COMPLETED | OUTPATIENT
Start: 2023-11-07 | End: 2023-11-07

## 2023-11-07 RX ORDER — ESCITALOPRAM OXALATE 10 MG/1
10 TABLET, FILM COATED ORAL AT BEDTIME
Refills: 0 | Status: DISCONTINUED | OUTPATIENT
Start: 2023-11-07 | End: 2023-11-09

## 2023-11-07 RX ORDER — ALPRAZOLAM 0.25 MG
1 TABLET ORAL THREE TIMES A DAY
Refills: 0 | Status: DISCONTINUED | OUTPATIENT
Start: 2023-11-07 | End: 2023-11-08

## 2023-11-07 RX ADMIN — Medication 1 MILLIGRAM(S): at 23:33

## 2023-11-07 RX ADMIN — SODIUM CHLORIDE 1000 MILLILITER(S): 9 INJECTION, SOLUTION INTRAVENOUS at 04:40

## 2023-11-07 RX ADMIN — ESCITALOPRAM OXALATE 10 MILLIGRAM(S): 10 TABLET, FILM COATED ORAL at 23:33

## 2023-11-07 RX ADMIN — Medication 1 TABLET(S): at 23:34
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
## 2023-11-07 NOTE — ED BEHAVIORAL HEALTH ASSESSMENT NOTE - NSACTIVEVENT_PSY_ALL_CORE
Financial/Triggering events leading to humiliation, shame, and/or despair (e.g., Loss of relationship, financial or health status) (real or anticipated) Financial, fights with wife/Triggering events leading to humiliation, shame, and/or despair (e.g., Loss of relationship, financial or health status) (real or anticipated)

## 2023-11-07 NOTE — ED CDU PROVIDER INITIAL DAY NOTE - PHYSICAL EXAMINATION
CONSTITUTIONAL: disheveled  SKIN: warm, dry, dried blood on face and right nare with no septal hematoma  HEAD: Normocephalic. no visible trauma. no facial crepitus  EYES: PERRL, EOMI, no conjunctival erythema  ENT: No nasal discharge; airway clear.  NECK: Supple; non tender.  ABD: soft ntnd  EXT: Normal ROM.  No clubbing, cyanosis or edema.   MSK: no chest wall tenderness. moving all extremities. no neck or back tenderness midline or paraspinal  NEURO: somnolent but arousable, follows all commands  PSYCH: Cooperative, +slurred speech. Denies SI/HI/AVH

## 2023-11-07 NOTE — ED BEHAVIORAL HEALTH ASSESSMENT NOTE - RISK ASSESSMENT
The patient's chronic risk factors include a family history of suicide (father) and reported anxiety and depression. His acute risk factors include financial stress (per friend), relationship issues, minimizing behavior, recent erratic behavior, recent disappearances, and recent suicidal ideation (per texts and statements he made to collateral). His risk factors include residential stability, family support, and having outpatient therapy.

## 2023-11-07 NOTE — ED PROVIDER NOTE - PHYSICAL EXAMINATION
CONSTITUTIONAL: disheveled  SKIN: warm, dry, dried blood on face and right nare with no septal hematoma  HEAD: Normocephalic. no visible trauma. no facial crepitus  EYES: PERRL, EOMI, no conjunctival erythema  ENT: No nasal discharge; airway clear.  NECK: Supple; non tender.  ABD: soft ntnd  EXT: Normal ROM.  No clubbing, cyanosis or edema.   MSK: no chest wall tenderness. moving all extremities. no neck or back tenderness midline or paraspinal  NEURO: somnolent but arousable, follows all commants  PSYCH: Cooperative, +slurred speech. Denies SI/HI/AVH CONSTITUTIONAL: disheveled  SKIN: warm, dry, dried blood on face and right nare with no septal hematoma  HEAD: Normocephalic. no visible trauma. no facial crepitus  EYES: PERRL, EOMI, no conjunctival erythema  ENT: No nasal discharge; airway clear.  NECK: Supple; non tender.  ABD: soft ntnd  EXT: Normal ROM.  No clubbing, cyanosis or edema.   MSK: no chest wall tenderness. moving all extremities. no neck or back tenderness midline or paraspinal  NEURO: somnolent but arousable, follows all commands  PSYCH: Cooperative, +slurred speech. Denies SI/HI/AVH

## 2023-11-07 NOTE — ED ADULT NURSE NOTE - OBJECTIVE STATEMENT
BIBA and [employer] c/o suicidal thoughts. Pt additionally accompanied by friend who endorses pt has had sudden, erratic behavior over the last couple of days. As per pts friend, pt endorsed suicidal ideation and a plan to his wife and therapist. Pt was reported missing to PD from his [place] residence by his wife. Pt denying SI/HI at this time. Pt endorses taking a bottle of Nyquil. 1:1 maintained.

## 2023-11-07 NOTE — ED BEHAVIORAL HEALTH NOTE - BEHAVIORAL HEALTH NOTE
===================    PRE-HOSPITAL COURSE    ==================    SOURCE:  ED provider and ED documentation     DETAILS:  Pt bibEMS after ingesting a bottle of nyquil    ============    ED COURSE    ============    SOURCE: ED provider and secondhand ED documentation.    ARRIVAL: Per ED documentation patient bibEMS to ED. Patient cooperative with triage process.     BELONGINGS: Unknown   BEHAVIOR: ED provider described patient to be calm, remains in behavioral and impulse control, and is not in restraints. Pt currently has 1:1 sitter.  Pt is not displaying aggression towards staff or others and was described as cooperative. Per provider, pt presenting with normal affect and mood is congruent with affect. Pt has a linear thought process. Provider stated that the patient is not endorsing current SI. No HI. Per provider pt is not endorsing A/VH. Laceration to L forearm. Provider reports that the patient appears to have poor hygiene.   TREATMENT: No medication administered. No restraints.     VISITORS: None     -----------------------------------------------   COVID Exposure Screen- collateral (i.e. third-party, chart review, belongings, etc; include EMS and ED staff)   ---------------------------------------------------   1. Has the patient had a COVID-19 test in the last 90 days? Unknown.   2. Has the patient tested positive for COVID-19 antibodies? Unknown.   3.Has the patient received 2 doses of the COVID-19 vaccine?  Unknown.   4. In the past 10 days, has the patient been around anyone with a positive COVID-19 test?* Unknown.   5.Has the patient been out of [place] within the past 10 days? Unknown.

## 2023-11-07 NOTE — ED BEHAVIORAL HEALTH ASSESSMENT NOTE - SUMMARY
Mr. Cannon is a  34 yo male, currently domiciled at home with family, currently employed, denies violence/legal history, uses cannabis, with no relevant PMHx, PPHx of reported depression/anxiety, no inpatient hospitalizations (however seen for SI in ED on 08/01/23), no suicide attempts, sees a therapist and is prescribed Alprazolam and Lexapro, was brought in by EMS accompanied by his friend to the ED after being found somnolent in a motel room and surrounded by an empty bottle of Nyquil, cyclobenzaprine tablets, Naprosyn tablets and alprazolam tablets.    Upon assessment the patient appears minimizing and guarded, his affect is irritable and anxious despite his stated mood being "good". Collateral obtained from friend and wife is concerning for recent erratic behavior, mood swings, disappearances, suicidal statements, and being found lethargic surrounded by empty medication bottles. At this time they believe that the patient took a bottle of Nyquil in a suicide attempt, and is not safe to be discharged. The patient's behavior is highly concerning for a suicidal intent, and he requires inpatient admission for stabilization.    #Plan  - Admit 9:39 once bed is made available  - Start Lexapro 10 mg at bedtime   - Start Xanax TID PRN for anxiety  (confirmed that this patient takes Xanax with [facility])

## 2023-11-07 NOTE — ED BEHAVIORAL HEALTH ASSESSMENT NOTE - DETAILS
Father with completed suicide in 2021 2 kids with their mother Father  by suicide in  see HPI Discussed

## 2023-11-07 NOTE — ED PROVIDER NOTE - PROGRESS NOTE DETAILS
MM: Per tox, rec 6-10 hour observation for clearance. No need for NAC at this time given acetaminophen level. Patient will then be medically cleared for psych after. Given patient presented at 2am, this would be 8am. Received signout from Dr. Camp pending tox and psychiatry evaluation.  Patient calm and appropriate at this time.  Remains on one-to-one.  Patient remains sleepy but easily arousable. Patient signed out to Dr. Cerrato. Patient is accepted to psychiatry, pending IPP bed. Received signout from Dr. Watts pending psychiatry evaluation.  Patient calm and appropriate at this time.  on 1:1.  Patient stable and a&ox4 when awake; sleeping otherwise Received pt from Dr Angela awaiting psych reassessment; pt is comfortable in the ED, tolerating PO, calm and cooperative.

## 2023-11-07 NOTE — ED BEHAVIORAL HEALTH ASSESSMENT NOTE - NSSUICPROTFACT_PSY_ALL_CORE
Responsibility to children, family, or others/Supportive social network of family or friends/Engaged in work or school Supportive social network of family or friends

## 2023-11-07 NOTE — CONSULT NOTE ADULT - ASSESSMENT
Assessment	  Concern for polypharmacy ingestion    Toxicologic Context: Acetaminophen can cause hepatotoxicity via metabolism by CPY2E1 enzyme into NAPQI, which depletes the liver's glutathione capacity leading to oxidative injury. In massive overdose, nephrotoxicity, metabolic acidosis with lactatemia, and altered mentation could occur. The RosePapi normogram allows us to estimate the likelihood of hepatotoxicity in an acute ingestion within the first 24 hours, and guide whether or not to initiate n-acetylcysteine (NAC) therapy. NAC is an antioxidant and detoxified NAQPI.    Diphenhydramine  and doxylamine are 1st generation H1-antagonist, and in overdose can cause an sedation, antimuscarinic toxidrome (delirium, agitation, carphologia, tachycardia, mydriasis, urinary retention, dry mucosa, flushed face, hypoactive bowel sounds, seizures) and QRS prolongation.        Recommendations:  Treatment:   1)  The patient demonstrates mild anticholinergic toxicity combined with sedative hypnotic effects of cyclobenzaprine.  Initially in setting of likely apap ingestion past 8 hours since potential ingestion started on NAC; however, APAP level at ~0300 of 38 below treatment line even given conservative estimation of ingestion at 1700, will DC NAC, not requiring full 21 hour protocol.  2)  Continue to symptomatically monitor and treat for sedative hypnotic intoxication, perform bladder scan to assess for urinary retention in setting of anticholinergic toxicity, consider saucedo catheter placement as needed.      All plans discussed with primary managing team.    Thank you for the consultation. Please reach out via Teams with any questions.  Randy Rizzo MD, Medical Toxicology Fellow   Mignon Burgess MD  Pediatric Hospitalist
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
## 2023-11-07 NOTE — ED BEHAVIORAL HEALTH ASSESSMENT NOTE - VIOLENCE PROTECTIVE FACTORS:
Residential stability/Sobriety/Insight into violence risk and need for management/treatment Residential stability/Insight into violence risk and need for management/treatment

## 2023-11-07 NOTE — ED ADULT NURSE NOTE - NSFALLRISKINTERV_ED_ALL_ED
Assistance OOB with selected safe patient handling equipment if applicable/Assistance with ambulation/Communicate fall risk and risk factors to all staff, patient, and family/Monitor gait and stability/Provide visual cue: yellow wristband, yellow gown, etc/Reinforce activity limits and safety measures with patient and family/Call bell, personal items and telephone in reach/Instruct patient to call for assistance before getting out of bed/chair/stretcher/Non-slip footwear applied when patient is off stretcher/[place] to call system/Physically safe environment - no spills, clutter or unnecessary equipment/Purposeful Proactive Rounding/Room/bathroom lighting operational, light cord in reach
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
## 2023-11-07 NOTE — ED BEHAVIORAL HEALTH ASSESSMENT NOTE - OTHER
Sutures and steri strips on bilateral wrists Friend Superficially cooperative [place] Sutures and steri strips on bilateral wrists, abrasions on lower face "good"

## 2023-11-07 NOTE — ED CDU PROVIDER INITIAL DAY NOTE - CLINICAL SUMMARY MEDICAL DECISION MAKING FREE TEXT BOX
33-year-old man, history of depression/anxiety, questionable history of bipolar disorder placed in CDU awaiting psych reassessment after he presented status post polypharmacy and recent history of erratic behavior.  Family history of completed suicide.  ED work-up was unremarkable, patient was cleared by tox.  Plan is constant observation and reassessment by psych.

## 2023-11-07 NOTE — ED BEHAVIORAL HEALTH ASSESSMENT NOTE - ADDITIONAL DETAILS ALL
see HPI see HPI- per collateral patient's overdose was a likely interrupted event however patient denies
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
## 2023-11-07 NOTE — CONSULT NOTE ADULT - SUBJECTIVE AND OBJECTIVE BOX
MEDICAL TOXICOLOGY CONSULT    HPI: 33 Y M presenting with polypharmacy ingestion including nyquil (acetaminophen, doxylamine), cyclobenzaprine,  naproxen, alprazolam, unclear dosing. LKW at 1700 yesterday, found at 2300 in external living facility altered, mildly somnolent with associated empty bottles. Per primary team, patient is arousable with airway intact; however, unable to respond and midly dissociative, no clonus, rigidity, hyperthermia, agitation.      PAST MEDICAL & SURGICAL HISTORY:  No pertinent past medical history      Abdominal lipoma  s/p removal          MEDICATION HISTORY:      FAMILY HISTORY:      Vital Signs Last 24 Hrs  T(C): 36.4 (07 Nov 2023 04:20), Max: 36.4 (07 Nov 2023 04:20)  T(F): 97.6 (07 Nov 2023 04:20), Max: 97.6 (07 Nov 2023 04:20)  HR: 89 (07 Nov 2023 01:45) (89 - 89)  BP: 153/90 (07 Nov 2023 01:45) (153/90 - 153/90)  BP(mean): --  RR: 18 (07 Nov 2023 01:45) (18 - 18)  SpO2: 99% (07 Nov 2023 01:45) (99% - 99%)    Parameters below as of 07 Nov 2023 01:45  Patient On (Oxygen Delivery Method): room air        SIGNIFICANT LABORATORY STUDIES:                        15.5   13.70 )-----------( 147      ( 07 Nov 2023 03:15 )             46.7       11-07    141  |  105  |  18  ----------------------------<  124<H>  3.6   |  23  |  0.9    Ca    9.3      07 Nov 2023 03:15    TPro  6.4  /  Alb  4.3  /  TBili  0.6  /  DBili  x   /  AST  24  /  ALT  37  /  AlkPhos  70  11-07          Urinalysis Basic - ( 07 Nov 2023 03:15 )    Color: x / Appearance: x / SG: x / pH: x  Gluc: 124 mg/dL / Ketone: x  / Bili: x / Urobili: x   Blood: x / Protein: x / Nitrite: x   Leuk Esterase: x / RBC: x / WBC x   Sq Epi: x / Non Sq Epi: x / Bacteria: x        CK: 520<H> 11-07 @ 03:24  Anion Gap: 13 11-07 @ 03:15  Aspirin Level: <0.3<L>  11-07 @ 03:15  Acetaminophen Level:  38.0<H>  11-07 @ 03:15

## 2023-11-07 NOTE — ED BEHAVIORAL HEALTH ASSESSMENT NOTE - OTHER PAST PSYCHIATRIC HISTORY (INCLUDE DETAILS REGARDING ONSET, COURSE OF ILLNESS, INPATIENT/OUTPATIENT TREATMENT)
Previous Dx: Depression     Previous Med Trials: Alprazolam 1mg (max 3mg day) - not found in [facility]    Previous IP treatment: denies     Previous SA: denies     Self harming: denies     Current MH treatment: previously with therapist but reports not seeing with the month ([facility])     Violence/Legal: denies Previous Dx: reported Anxiety and Depression,     Previous Med Trials: Alprazolam 1mg (max 3mg day) - not found in [facility]  No history of suicide attempts, denies history of self harming  See a therapist "Carlos" with wife every other week, Dr. Claudia Beck ([phone]) prescribes Xanax  Previous IP treatment: denies   Violence/Legal: denies

## 2023-11-07 NOTE — ED BEHAVIORAL HEALTH ASSESSMENT NOTE - NSBHMSEEYE_PSY_A_CORE
Good Z Plasty Text: The lesion was extirpated to the level of the fat with a #15 scalpel blade.  Given the location of the defect, shape of the defect and the proximity to free margins a Z-plasty was deemed most appropriate for repair.  Using a sterile surgical marker, the appropriate transposition arms of the Z-plasty were drawn incorporating the defect and placing the expected incisions within the relaxed skin tension lines where possible.    The area thus outlined was incised deep to adipose tissue with a #15 scalpel blade.  The skin margins were undermined to an appropriate distance in all directions utilizing iris scissors.  The opposing transposition arms were then transposed into place in opposite direction and anchored with interrupted buried subcutaneous sutures.

## 2023-11-07 NOTE — ED BEHAVIORAL HEALTH ASSESSMENT NOTE - NSBHATTESTCOMMENTATTENDFT_PSY_A_CORE
I fully participated in the care of this patient.  I have made amendments to the documentation where appropriate and otherwise agree with the history, physical exam, and plan as documented by the Resident/Fellow/Student.

## 2023-11-07 NOTE — ED ADULT NURSE NOTE - CHIEF COMPLAINT QUOTE
Pt. BIBA and PD due to suicidal ideations. Pt. denies any SI at this time, however as per friend pt. voiced SI along with a plan. Pt. denies any drug usage or ETOH but just taking a bottle of Nyquil. As per PD was reported missing by wife from [facility]

## 2023-11-07 NOTE — ED BEHAVIORAL HEALTH ASSESSMENT NOTE - CURRENT MEDICATION
Xanax 1mg qdaily prn Confirmed with [facility] at [phone]  Xanax 1 mg TID PRN anxiety (picked up in November)  Lexapro 20 mg at bedtime (last picked up early October)  Finasteride 1 mg daily  Minocycline  Testosterone injections (per wife)

## 2023-11-07 NOTE — ED ADULT TRIAGE NOTE - CHIEF COMPLAINT QUOTE
Pt. BIBA and PD due to suicidal ideations. Pt. denies any SI at this time, however as per friend pt. voiced SI along with a plan. Pt. denies any drug usage or ETOH but just taking a bottle of Nyquil. As per PD was reported missing by wife from [facility]
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
## 2023-11-07 NOTE — ED BEHAVIORAL HEALTH ASSESSMENT NOTE - NSSUICRSKFACTOR_PSY_ALL_CORE
Current and Past Psychiatric Diagnoses/Presenting Symptoms/Historical Factors/Treatment Related Factors/Activating Events/Stressors Current and Past Psychiatric Diagnoses/Historical Factors/Treatment Related Factors/Activating Events/Stressors

## 2023-11-07 NOTE — ED ADULT NURSE REASSESSMENT NOTE - NS ED NURSE REASSESS COMMENT FT1
Assessed pt upon my arrival, he is A+Ox4 and ambulatory at baseline. Pt denies having suicidal thoughts or a plan to harm self. Pt states he has "two beautiful daughters he would not do that". Pt has 1:1 maintained till further eval by psych. V/S stable, IV intact, patent and flushing with ease.

## 2023-11-07 NOTE — ED ADULT NURSE NOTE - NS ED NURSE DISCH DISPOSITION
----- Message from Iris Lombardi sent at 2/2/2022  8:21 AM CST -----  Type: Needs Medical Advice  Who Called:  Patient  Best Call Back Number:   Additional Information: Calling to find out if she can get something called in for a bladder infection before her appointment on friday         Transferred

## 2023-11-08 DIAGNOSIS — R45.851 SUICIDAL IDEATIONS: ICD-10-CM

## 2023-11-08 PROCEDURE — 80053 COMPREHEN METABOLIC PANEL: CPT

## 2023-11-08 PROCEDURE — 85027 COMPLETE CBC AUTOMATED: CPT

## 2023-11-08 PROCEDURE — 36415 COLL VENOUS BLD VENIPUNCTURE: CPT

## 2023-11-08 PROCEDURE — 99233 SBSQ HOSP IP/OBS HIGH 50: CPT

## 2023-11-08 PROCEDURE — 84443 ASSAY THYROID STIM HORMONE: CPT

## 2023-11-08 PROCEDURE — 83036 HEMOGLOBIN GLYCOSYLATED A1C: CPT

## 2023-11-08 PROCEDURE — 80074 ACUTE HEPATITIS PANEL: CPT

## 2023-11-08 PROCEDURE — 80061 LIPID PANEL: CPT

## 2023-11-08 RX ORDER — FINASTERIDE 5 MG/1
1 TABLET, FILM COATED ORAL DAILY
Refills: 0 | Status: DISCONTINUED | OUTPATIENT
Start: 2023-11-08 | End: 2023-11-08

## 2023-11-08 RX ORDER — ESCITALOPRAM OXALATE 10 MG/1
10 TABLET, FILM COATED ORAL DAILY
Refills: 0 | Status: DISCONTINUED | OUTPATIENT
Start: 2023-11-08 | End: 2023-11-08

## 2023-11-08 RX ORDER — ALPRAZOLAM 0.25 MG
1 TABLET ORAL THREE TIMES A DAY
Refills: 0 | Status: DISCONTINUED | OUTPATIENT
Start: 2023-11-08 | End: 2023-11-09

## 2023-11-08 RX ADMIN — Medication 1 MILLIGRAM(S): at 09:23

## 2023-11-08 RX ADMIN — Medication 1 TABLET(S): at 06:11

## 2023-11-08 RX ADMIN — ESCITALOPRAM OXALATE 10 MILLIGRAM(S): 10 TABLET, FILM COATED ORAL at 20:50

## 2023-11-08 RX ADMIN — Medication 1 MILLIGRAM(S): at 20:51

## 2023-11-08 RX ADMIN — Medication 1 MILLIGRAM(S): at 17:21

## 2023-11-08 NOTE — ED CDU PROVIDER SUBSEQUENT DAY NOTE - ATTENDING APP SHARED VISIT CONTRIBUTION OF CARE
I have personally seen and examined this patient.  I have fully participated in the care of this patient. I have reviewed all pertinent clinical information, including history, physical exam, plan and the PA's note and agree except as noted in mdm.

## 2023-11-08 NOTE — BH PATIENT PROFILE - FALL HARM RISK - UNIVERSAL INTERVENTIONS
Bed in lowest position, wheels locked, appropriate side rails in place/Call bell, personal items and telephone in reach/Instruct patient to call for assistance before getting out of bed or chair/Non-slip footwear when patient is out of bed/[place] to call system/Physically safe environment - no spills, clutter or unnecessary equipment/Purposeful Proactive Rounding/Room/bathroom lighting operational, light cord in reach

## 2023-11-08 NOTE — ED CDU PROVIDER SUBSEQUENT DAY NOTE - HISTORY
No acute events overnight. Discussed patient care with patient and patient's mother. Mother could not find name of antibiotic prescribed by outpatient doctor. Patient with history of Tonsillitis. Medications ordered. Pending Psychiatric admission/bed placement.

## 2023-11-08 NOTE — ED CDU PROVIDER SUBSEQUENT DAY NOTE - PROGRESS NOTE DETAILS
Constant Observation 1:1 order noted to be absent and placed. Patient has been under 1:1 observation by PCA/staff since my own 7pm shift beginning and signed out as prior observation that had taken place. Patient resting comfortably. Will continue to monitor.

## 2023-11-08 NOTE — ED CDU PROVIDER DISPOSITION NOTE - NS ED ATTENDING STATEMENT MOD
This was a shared visit with the TRAN. I reviewed and verified the documentation and independently performed the documented: Attending with

## 2023-11-08 NOTE — ED BEHAVIORAL HEALTH NOTE - BEHAVIORAL HEALTH NOTE
=========  REASSESSMENT  =========	  SOURCE:  HEATHER Hu and secondhand ED documentation.  BEHAVIOR: RN described patient to be currently sleeping. Pt had an episode of verbal agitation, anxiousness, and voiced concerns re: amoxicillin. Pt offered medication and accepted without issue. No other episodes of agitation or aggression. No other acute issues.      TREATMENT:  Per chart and RN, patient received qhs medications: PO Lexapro 10mg and PRN medications: PO Xanax 1mg for agitation.   VISITORS:  Per RN, no visitors at bedside.

## 2023-11-08 NOTE — ED CDU PROVIDER DISPOSITION NOTE - NS_OBSORDERDATE_ED_A_ED
Patient:   ANIKA LOPEZ            MRN: [id number]            FIN: [id number]               Age:   72 years     Sex:  Female     :  1944   Associated Diagnoses:   Finger laceration   Author:   Colby Knott MD      Chief Complaint  pt here for finger laceration to her left index finger.  she was using a  when she cut her finger.  pt also wants her lungs rechecked also as she still isn't feeling better yet. (17 15:48 by Finesse ROSE, Susanna).      Procedure   Laceration repair procedure   Date:  2017.     Location: the left index finger.     Preparation and technique: anesthesia, skin prepped in usual sterile fashion, sterile preparation of site in usual fashion, tourniquet applied ring-shaped, skin closure completed with tissue adhesive, drydressing applied.     Procedure tolerated: well.     No Complications.        Impression and Plan   Diagnosis     Finger laceration ([id number] S61.211A).     Patient Instructions:  wound care discussed.   07-Nov-2023 16:42

## 2023-11-08 NOTE — BH PATIENT PROFILE - HOME MEDICATIONS
famotidine 20 mg oral tablet , 1 tab(s) orally once a day  doxycycline hyclate 100 mg oral tablet , 1 tab(s) orally 2 times a day   Bactrim  mg-160 mg oral tablet , 1 tab(s) orally every 12 hours   cephalexin 500 mg oral capsule , 1 cap(s) orally every 6 hours   Peridex 0.12% mucous membrane liquid , 15 milliliter(s) orally 2 times a day

## 2023-11-08 NOTE — BH PATIENT PROFILE - NSSBIRTDRGBRIEFINTDET_GEN_A_CORE
Using illicit substances at the unhealthy amount identified increases the risk of substance abuse related health problems.  Illicit substance use can interact with existing medical conditions and/ or medication I.e. (hypertension, depression, anxiety, insomnia, injury, congestive heart failure, diabetes, breast cancer risk)   If the patient has a condition or takes medication with contraindications to illicit substance use, it is recommended to abstain from illicit substance use, it is recommended to abstain from illicit substance use, or use substances below the recommended limits.

## 2023-11-08 NOTE — ED CDU PROVIDER DISPOSITION NOTE - CLINICAL COURSE
Patient presents after erratic behavior and suicidal ideations. Questionable overdose. Cleared by tox team. Patient evaluated by psychiatry who recommends involuntary psych admission. Patient remains on 1:1. Placed in obs pending psych bed. Bed now available. Patient admitted to psychiatry service for further management.

## 2023-11-08 NOTE — BH PATIENT PROFILE - NSPROPTRIGHTBILLOFRIGHTS_GEN_A_NUR
patient Fluconazole Pregnancy And Lactation Text: This medication is Pregnancy Category C and it isn't know if it is safe during pregnancy. It is also excreted in breast milk.

## 2023-11-08 NOTE — ED CDU PROVIDER SUBSEQUENT DAY NOTE - CLINICAL SUMMARY MEDICAL DECISION MAKING FREE TEXT BOX
Patient presents after erratic behavior and suicidal ideations. Patient presents after erratic behavior and suicidal ideations. Questionable overdose. Cleared by tox team. Patient evaluated by psychiatry who recommends involuntary psych admission. Patient remains on 1:1. Psych bed pending at this time.

## 2023-11-08 NOTE — ED CDU PROVIDER SUBSEQUENT DAY NOTE - PHYSICAL EXAMINATION
As Follows:  CONST: Well appearing in NAD  EYES: PERRL, EOMI, Sclera and conjunctiva clear.   ENT: No nasal discharge. Oropharynx normal appearing, no erythema or exudates. Uvula midline. Airway intact. Tonsillitis without abscess or swelling.   CARD: No murmurs, rubs, or gallops; Normal rate and rhythm  RESP: BS Equal B/L, No wheezes, rhonchi or rales. No distress or accessory breathing  SKIN: Warm, dry, no acute rashes. MMM  NEURO: A&Ox3, No focal deficits. Strength and sensation intact. Steady gait  PSYCH: Calm, cooperative at time of exam.

## 2023-11-08 NOTE — ED ADULT NURSE REASSESSMENT NOTE - NS ED NURSE REASSESS COMMENT FT1
Pt assessed. Pt resting comfortably at this time. 1:1 maintained for safety. Safety and comfort measures rendered..

## 2023-11-09 DIAGNOSIS — Z86.59 PERSONAL HISTORY OF OTHER MENTAL AND BEHAVIORAL DISORDERS: ICD-10-CM

## 2023-11-09 DIAGNOSIS — F41.1 GENERALIZED ANXIETY DISORDER: ICD-10-CM

## 2023-11-09 LAB
1OH-MIDAZOLAM UR CFM-MCNC: NEGATIVE NG/ML — SIGNIFICANT CHANGE UP
1OH-MIDAZOLAM UR CFM-MCNC: NEGATIVE NG/ML — SIGNIFICANT CHANGE UP
7AMINOCLONAZEPAM UR CFM-MCNC: 252 NG/ML — HIGH
7AMINOCLONAZEPAM UR CFM-MCNC: 252 NG/ML — HIGH
A1C WITH ESTIMATED AVERAGE GLUCOSE RESULT: 5.3 % — SIGNIFICANT CHANGE UP (ref 4–5.6)
A1C WITH ESTIMATED AVERAGE GLUCOSE RESULT: 5.3 % — SIGNIFICANT CHANGE UP (ref 4–5.6)
ALPHA-HYDROXYALPRAZOLAM, UR RESULT: 455 NG/ML — HIGH
ALPHA-HYDROXYALPRAZOLAM, UR RESULT: 455 NG/ML — HIGH
ALPRAZ UR CFM-MCNC: 592 NG/ML — HIGH
ALPRAZ UR CFM-MCNC: 592 NG/ML — HIGH
BENZODIAZ UR QL SCN: POSITIVE
BENZODIAZ UR QL SCN: POSITIVE
BENZODIAZEPINES IN-HOUSE INTERPRETATION: POSITIVE
BENZODIAZEPINES IN-HOUSE INTERPRETATION: POSITIVE
BENZOYLEGONINE, UR RESULT: 3363 NG/ML — HIGH
BENZOYLEGONINE, UR RESULT: 3363 NG/ML — HIGH
BZE UR QL SCN: 3363 NG/ML — HIGH
BZE UR QL SCN: 3363 NG/ML — HIGH
CHOLEST SERPL-MCNC: 187 MG/DL — SIGNIFICANT CHANGE UP
CHOLEST SERPL-MCNC: 187 MG/DL — SIGNIFICANT CHANGE UP
CLONAZEPAM UR CFM-MCNC: NEGATIVE NG/ML — SIGNIFICANT CHANGE UP
CLONAZEPAM UR CFM-MCNC: NEGATIVE NG/ML — SIGNIFICANT CHANGE UP
COCAINE IN-HOUSE INTERPRETATION: POSITIVE
COCAINE IN-HOUSE INTERPRETATION: POSITIVE
COCAINE UR QL SCN: POSITIVE
COCAINE UR QL SCN: POSITIVE
DIAZEPAM UR CFM-MCNC: NEGATIVE NG/ML — SIGNIFICANT CHANGE UP
DIAZEPAM UR CFM-MCNC: NEGATIVE NG/ML — SIGNIFICANT CHANGE UP
ESTIMATED AVERAGE GLUCOSE: 105 MG/DL — SIGNIFICANT CHANGE UP (ref 68–114)
ESTIMATED AVERAGE GLUCOSE: 105 MG/DL — SIGNIFICANT CHANGE UP (ref 68–114)
FLUNITRAZEPAM UR CFM-MCNC: NEGATIVE NG/ML — SIGNIFICANT CHANGE UP
FLUNITRAZEPAM UR CFM-MCNC: NEGATIVE NG/ML — SIGNIFICANT CHANGE UP
FLURAZEPAM UR CFM-MCNC: NEGATIVE NG/ML — SIGNIFICANT CHANGE UP
FLURAZEPAM UR CFM-MCNC: NEGATIVE NG/ML — SIGNIFICANT CHANGE UP
HDLC SERPL-MCNC: 29 MG/DL — LOW
HDLC SERPL-MCNC: 29 MG/DL — LOW
LIPID PNL WITH DIRECT LDL SERPL: 103 MG/DL — HIGH
LIPID PNL WITH DIRECT LDL SERPL: 103 MG/DL — HIGH
LORAZEPAM UR CFM-MCNC: NEGATIVE NG/ML — SIGNIFICANT CHANGE UP
LORAZEPAM UR CFM-MCNC: NEGATIVE NG/ML — SIGNIFICANT CHANGE UP
MIDAZOLAM UR CFM-MCNC: NEGATIVE NG/ML — SIGNIFICANT CHANGE UP
MIDAZOLAM UR CFM-MCNC: NEGATIVE NG/ML — SIGNIFICANT CHANGE UP
NON HDL CHOLESTEROL: 158 MG/DL — HIGH
NON HDL CHOLESTEROL: 158 MG/DL — HIGH
NORDIAZEPAM, UR RESULT: NEGATIVE NG/ML — SIGNIFICANT CHANGE UP
NORDIAZEPAM, UR RESULT: NEGATIVE NG/ML — SIGNIFICANT CHANGE UP
OXAZEPAM UR QL SCN: NEGATIVE NG/ML — SIGNIFICANT CHANGE UP
OXAZEPAM UR QL SCN: NEGATIVE NG/ML — SIGNIFICANT CHANGE UP
OXAZEPAM, UR RESULT: NEGATIVE NG/ML — SIGNIFICANT CHANGE UP
OXAZEPAM, UR RESULT: NEGATIVE NG/ML — SIGNIFICANT CHANGE UP
TEMAZEPAM UR CFM-MCNC: NEGATIVE NG/ML — SIGNIFICANT CHANGE UP
TEMAZEPAM UR CFM-MCNC: NEGATIVE NG/ML — SIGNIFICANT CHANGE UP
TRIGL SERPL-MCNC: 273 MG/DL — HIGH
TRIGL SERPL-MCNC: 273 MG/DL — HIGH
TSH SERPL-MCNC: 1.46 UIU/ML — SIGNIFICANT CHANGE UP (ref 0.27–4.2)
TSH SERPL-MCNC: 1.46 UIU/ML — SIGNIFICANT CHANGE UP (ref 0.27–4.2)

## 2023-11-09 PROCEDURE — 99233 SBSQ HOSP IP/OBS HIGH 50: CPT

## 2023-11-09 PROCEDURE — 90792 PSYCH DIAG EVAL W/MED SRVCS: CPT

## 2023-11-09 RX ORDER — HALOPERIDOL DECANOATE 100 MG/ML
5 INJECTION INTRAMUSCULAR EVERY 6 HOURS
Refills: 0 | Status: DISCONTINUED | OUTPATIENT
Start: 2023-11-09 | End: 2023-11-15

## 2023-11-09 RX ORDER — LANOLIN ALCOHOL/MO/W.PET/CERES
5 CREAM (GRAM) TOPICAL AT BEDTIME
Refills: 0 | Status: DISCONTINUED | OUTPATIENT
Start: 2023-11-09 | End: 2023-11-15

## 2023-11-09 RX ORDER — SALICYLIC ACID 0.5 %
1 CLEANSER (GRAM) TOPICAL THREE TIMES A DAY
Refills: 0 | Status: DISCONTINUED | OUTPATIENT
Start: 2023-11-09 | End: 2023-11-15

## 2023-11-09 RX ORDER — VENLAFAXINE HCL 75 MG
37.5 CAPSULE, EXT RELEASE 24 HR ORAL DAILY
Refills: 0 | Status: DISCONTINUED | OUTPATIENT
Start: 2023-11-09 | End: 2023-11-13

## 2023-11-09 RX ORDER — HYDROXYZINE HCL 10 MG
50 TABLET ORAL EVERY 6 HOURS
Refills: 0 | Status: DISCONTINUED | OUTPATIENT
Start: 2023-11-09 | End: 2023-11-15

## 2023-11-09 RX ORDER — FINASTERIDE 5 MG/1
1 TABLET, FILM COATED ORAL
Refills: 0 | DISCHARGE

## 2023-11-09 RX ORDER — TRAZODONE HCL 50 MG
50 TABLET ORAL AT BEDTIME
Refills: 0 | Status: DISCONTINUED | OUTPATIENT
Start: 2023-11-09 | End: 2023-11-15

## 2023-11-09 RX ORDER — DIPHENHYDRAMINE HCL 50 MG
50 CAPSULE ORAL EVERY 6 HOURS
Refills: 0 | Status: DISCONTINUED | OUTPATIENT
Start: 2023-11-09 | End: 2023-11-15

## 2023-11-09 RX ADMIN — Medication 1 APPLICATION(S): at 16:16

## 2023-11-09 RX ADMIN — Medication 50 MILLIGRAM(S): at 20:06

## 2023-11-09 RX ADMIN — Medication 1 MILLIGRAM(S): at 20:06

## 2023-11-09 RX ADMIN — Medication 1 MILLIGRAM(S): at 08:04

## 2023-11-09 RX ADMIN — Medication 2 MILLIGRAM(S): at 13:11

## 2023-11-09 RX ADMIN — Medication 50 MILLIGRAM(S): at 16:59

## 2023-11-09 RX ADMIN — Medication 1 TABLET(S): at 20:06

## 2023-11-09 NOTE — BH INPATIENT PSYCHIATRY ASSESSMENT NOTE - NSBHASSESSSUMMFT_PSY_ALL_CORE
Mr. Cannon is a  34 yo male, currently domiciled at home with family, currently employed, denies violence/legal history, uses cannabis, UA + for cocaine, with PMHx of low Testerone secondary to remote anabolic steroid use, PPHx of reported depression/anxiety, no inpatient hospitalizations (however seen for SI in ED on 08/01/23), no suicide attempts, attends marriage counseling, sees private psychiatrist Dr. Beck whos prescribes Alprazolam and Lexapro, was brought in by EMS accompanied by his friend to the ED after being found somnolent in a motel room and surrounded by an empty bottle of Nyquil, cyclobenzaprine tablets, Naprosyn tablets and alprazolam tablets.    Upon assessment the patient appears to be minimizing and guarded surrounding the details of his presentation and week leading up to his admission. He appears to be depressed and anxious, with evidence of bilateral wrist lacerations requiring sutures. He remains organized, oriented, and amenable to discussion. Prior collateral from ED obtained from friend and wife is concerning for recent erratic behavior, mood swings, disappearances, suicidal statements, and being found lethargic surrounded by empty medication bottles. At this time collateral believe that the patient took a bottle of Nyquil in a suicide attempt, and is not safe to be discharged. The patient's behavior is highly concerning for a suicidal attempt, and requires inpatient admission for stabilization and further evaluation. Patient will benefit from continued inpatient psychiatric hospitalization for further medication management and stabilization of symptoms. Patient will be started on venlafaxine 37.5 mg to target patient's persistent anxiety and recent exacerbation of depressive symptoms. Patient denies benefit of home Lexapro 20mg, where he requires Xanax daily for persistent anxiety, so this will be continued. In attempt to bridge to a benzodiazepine with a longer half life, patient's home Xanax will be discontinued and switched to ativan 2mg BID.     #MDD  #TRISTON with history of panic disorder  - Start venlafaxine 37.5mg daily for patient's low mood and anxiety   - Start Ativan 2mg BID for anxiety, as attempt to get patient off home Xana  - Discontinue Lexapro 10 mg at bedtime   - Discontinue Xanax 1mg TID PRN for anxiety    #Sutures in place right wrist  - Keep dressing clean  - Remove sutures on 11/14    #History of Bronchitis  - Start 5 day course of Augmentin 875mg/125mg (day 1 of 5)    #Agitation  - For acute agitation not amenable to verbal redirection give haldol 5mg po, benadryl 50mg, ativan 2 mg po q6 with escalation to IM if patient is danger to self/other; if IM formulation used please order repeat EKG to ensure qtc <500ms   Mr. Cannon is a  34 yo male, currently domiciled at home with family, currently employed, denies violence/legal history, uses cannabis, UA + for cocaine, with PMHx of low Testerone secondary to remote anabolic steroid use, PPHx of reported depression/anxiety, no inpatient hospitalizations (however seen for SI in ED on 08/01/23), no suicide attempts, attends marriage counseling, sees private psychiatrist Dr. Beck whos prescribes Alprazolam and Lexapro, was brought in by EMS accompanied by his friend to the ED after being found somnolent in a motel room and surrounded by an empty bottle of Nyquil, cyclobenzaprine tablets, Naprosyn tablets and alprazolam tablets.    Upon assessment the patient appears to be minimizing and guarded surrounding the details of his presentation and week leading up to his admission. He appears to be depressed and anxious, with evidence of bilateral wrist lacerations requiring sutures. He remains organized, oriented, and amenable to discussion. Prior collateral from ED obtained from friend and wife is concerning for recent erratic behavior, mood swings, disappearances, suicidal statements, and being found lethargic surrounded by empty medication bottles. At this time collateral believe that the patient took a bottle of Nyquil in a suicide attempt, and is not safe to be discharged. The patient's behavior is highly concerning for a suicidal attempt, and requires inpatient admission for stabilization and further evaluation. Patient will benefit from continued inpatient psychiatric hospitalization for further medication management and stabilization of symptoms. Patient will be started on venlafaxine 37.5 mg to target patient's persistent anxiety and recent exacerbation of depressive symptoms. Patient denies benefit of home Lexapro 20mg, where he requires Xanax daily for persistent anxiety, so this will be continued. In attempt to bridge to a benzodiazepine with a longer half life, patient's home Xanax will be discontinued and switched to ativan 2mg BID.     #MDD  #TRISTON with history of panic disorder  - Start venlafaxine 37.5mg daily for patient's low mood and anxiety   - Start Ativan 1mg BID for anxiety  - Start Trazadone 50mg at bedtime  - Discontinue Lexapro 10 mg at bedtime   - Discontinue Xanax 1mg TID PRN for anxiety    #Sutures in place right wrist  - Keep dressing clean  - Remove sutures on 11/14    #History of Bronchitis  - Start 5 day course of Augmentin 875mg/125mg (day 1 of 5)    #Agitation  - For acute agitation not amenable to verbal redirection give haldol 5mg po q6h PRN, benadryl 50mg q6h PRN, ativan 1mg po q8h PRN with escalation to IM if patient is danger to self/other; if IM formulation used please order repeat EKG to ensure qtc <500ms

## 2023-11-09 NOTE — BH SAFETY PLAN - WARNING SIGN 2
Another warning sign that a crisis is going to develop for me would be Another warning sign that a crisis is going to develop for me would be that I would isolate.

## 2023-11-09 NOTE — BH INPATIENT PSYCHIATRY ASSESSMENT NOTE - OTHER PAST PSYCHIATRIC HISTORY (INCLUDE DETAILS REGARDING ONSET, COURSE OF ILLNESS, INPATIENT/OUTPATIENT TREATMENT)
Previous Dx: reported Anxiety and Depression,     Current medication: Alprazolam 1mg PRN (max 3mg day) - not found in I-STOP, lexapro 20mg daily  Previous Med Trials: Paxil, Klonopin  No history of suicide attempts, denies history of self harming  See a therapist "Carlos" with wife every other week, Dr. Claudia Beck ([phone]) prescribes Xanax  Previous IP treatment: denies   Violence/Legal: denies

## 2023-11-09 NOTE — BH INPATIENT PSYCHIATRY ASSESSMENT NOTE - RISK ASSESSMENT
Warning signs: isolation, anxiety  Static risk factors: male gender,  race  Modifiable risk factors: psychiatric illness, recent psychosocial stressors, medical illness, substance use/intoxication, unemployment.  Protective factors: social support, family connectiveness, parenthood  Access to lethal: denies  Level of risk: moderate

## 2023-11-09 NOTE — UM REPORT PROGRESS NOTE - NSUMRMPROVIDER_GEN_A_CORE FT
Patient: Markie Fairchild   : 1990  INSURANCE: [employer]   ID# [id number]  [phone]      Spoke to Leslie Myers # [id number]. Patient has been approved for 7 days from -, review is . cm is Mirian - [phone] ext [phone].   Patient: Markie Fairchild   : 1990  INSURANCE: [employer]   ID# [id number]  [phone]      Spoke to Leslie Myers # [id number]. Patient has been approved for 7 days from -, review is . cm is iMrian H- [phone].   call to Mirian Weathers DC 11-15 LCD     Patient: Markie Fairchild   : 1990  INSURANCE: [employer]   ID# [id number]  [phone]      Spoke to Leslie Myers # [id number]. Patient has been approved for 7 days from -, review is . cm is Mirian H- [phone] ext [phone].   call to Mirian Weathers DC 11-15 LCD 11-14   11-15 LCD  DC on 11-15
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
## 2023-11-09 NOTE — BH INPATIENT PSYCHIATRY ASSESSMENT NOTE - NSBHATTESTCOMMENTATTENDFT_PSY_A_CORE
Patient seen, evaluated and discussed with the resident, Dr Wade. Agree with above assessment and plan. continue medications as above.

## 2023-11-09 NOTE — BH SAFETY PLAN - INTERNAL COPING STRATEGY 2
Another way I would cope with my issues would be Another way I would cope with my issues would be that I would walk around a lot.

## 2023-11-09 NOTE — BH SAFETY PLAN - INTERNAL COPING STRATEGY 3
Also, another way I would cope with my issues would be Also, another way I would cope with my issues would be count to ten.

## 2023-11-09 NOTE — BH SOCIAL WORK INITIAL PSYCHOSOCIAL EVALUATION - NSBHDASTSCOREFT_PSY_ALL_CORE
3 (11-08-23 @ 22:10) Home Suture Removal Text: Patient was provided a home suture removal kit and will remove their sutures at home.  If they have any questions or difficulties they will call the office.
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
## 2023-11-09 NOTE — BH INPATIENT PSYCHIATRY ASSESSMENT NOTE - NSBHCHARTREVIEWVS_PSY_A_CORE FT
Vital Signs Last 24 Hrs  T(C): 35.9 (11-09-23 @ 09:23), Max: 36.5 (11-08-23 @ 17:13)  T(F): 96.7 (11-09-23 @ 09:23), Max: 97.7 (11-08-23 @ 17:13)  HR: 97 (11-09-23 @ 09:23) (97 - 101)  BP: 127/74 (11-09-23 @ 09:23) (116/71 - 149/72)  BP(mean): --  RR: 18 (11-09-23 @ 09:23) (18 - 20)  SpO2: 100% (11-08-23 @ 17:13) (100% - 100%)

## 2023-11-09 NOTE — BH SAFETY PLAN - THE ONE THING THAT IS MOST IMPORTANT TO ME AND WORTH LIVING FOR IS:
The one thing that is most important to me and worth living for would be  The one thing that is most important to me and worth living for would be my family.

## 2023-11-09 NOTE — BH SAFETY PLAN - WARNING SIGN 1
A warning sign that a crisis is going to develop for me would be A warning sign that a crisis is going to develop for me would be that I would get anxiety.

## 2023-11-09 NOTE — BH SAFETY PLAN - INTERNAL COPING STRATEGY 1
One way I would cope with my issues would be One way I would cope with my issues would be that I get deep thoughts.

## 2023-11-09 NOTE — BH INPATIENT PSYCHIATRY ASSESSMENT NOTE - NSCOMMENTSUICRISKFACT_PSY_ALL_CORE
Substance use (cocaine, THC, alcohol), triggering events (financial burden, marital problems, unemployment)

## 2023-11-09 NOTE — BH INPATIENT PSYCHIATRY ASSESSMENT NOTE - CURRENT MEDICATION
MEDICATIONS  (STANDING):  escitalopram 10 milliGRAM(s) Oral at bedtime    MEDICATIONS  (PRN):  acetaminophen     Tablet .. 650 milliGRAM(s) Oral every 6 hours PRN Moderate Pain (4 - 6)  ALPRAZolam 1 milliGRAM(s) Oral three times a day PRN anxiety   MEDICATIONS  (STANDING):  amoxicillin  875 milliGRAM(s)/clavulanate 1 Tablet(s) Oral two times a day  LORazepam     Tablet 2 milliGRAM(s) Oral two times a day  venlafaxine 37.5 milliGRAM(s) Oral daily    MEDICATIONS  (PRN):  acetaminophen     Tablet .. 650 milliGRAM(s) Oral every 6 hours PRN Moderate Pain (4 - 6)  aluminum hydroxide/magnesium hydroxide/simethicone Suspension 30 milliLiter(s) Oral every 6 hours PRN Dyspepsia  diphenhydrAMINE 50 milliGRAM(s) Oral every 6 hours PRN Extrapyramidal prophylaxis  haloperidol     Tablet 5 milliGRAM(s) Oral every 6 hours PRN agitation  hydrOXYzine hydrochloride 50 milliGRAM(s) Oral every 6 hours PRN anxiety  melatonin. 5 milliGRAM(s) Oral at bedtime PRN Insomnia   MEDICATIONS  (STANDING):  amoxicillin  875 milliGRAM(s)/clavulanate 1 Tablet(s) Oral two times a day  traZODone 50 milliGRAM(s) Oral at bedtime  venlafaxine 37.5 milliGRAM(s) Oral daily    MEDICATIONS  (PRN):  acetaminophen     Tablet .. 650 milliGRAM(s) Oral every 6 hours PRN Moderate Pain (4 - 6)  aluminum hydroxide/magnesium hydroxide/simethicone Suspension 30 milliLiter(s) Oral every 6 hours PRN Dyspepsia  diphenhydrAMINE 50 milliGRAM(s) Oral every 6 hours PRN Extrapyramidal prophylaxis  haloperidol     Tablet 5 milliGRAM(s) Oral every 6 hours PRN agitation  hydrOXYzine hydrochloride 50 milliGRAM(s) Oral every 6 hours PRN anxiety  LORazepam     Tablet 1 milliGRAM(s) Oral every 8 hours PRN severe anxiety  melatonin. 5 milliGRAM(s) Oral at bedtime PRN Insomnia   MEDICATIONS  (STANDING):  amoxicillin  875 milliGRAM(s)/clavulanate 1 Tablet(s) Oral two times a day  traZODone 50 milliGRAM(s) Oral at bedtime  venlafaxine 37.5 milliGRAM(s) Oral daily    MEDICATIONS  (PRN):  acetaminophen     Tablet .. 650 milliGRAM(s) Oral every 6 hours PRN Moderate Pain (4 - 6)  aluminum hydroxide/magnesium hydroxide/simethicone Suspension 30 milliLiter(s) Oral every 6 hours PRN Dyspepsia  diphenhydrAMINE 50 milliGRAM(s) Oral every 6 hours PRN Extrapyramidal prophylaxis  haloperidol     Tablet 5 milliGRAM(s) Oral every 6 hours PRN agitation  hydrOXYzine hydrochloride 50 milliGRAM(s) Oral every 6 hours PRN anxiety  LORazepam     Tablet 1 milliGRAM(s) Oral every 8 hours PRN severe anxiety  melatonin. 5 milliGRAM(s) Oral at bedtime PRN Insomnia  vitamin A &amp; D Ointment 1 Application(s) Topical three times a day PRN dry skin

## 2023-11-09 NOTE — BH INPATIENT PSYCHIATRY ASSESSMENT NOTE - NSBHMSEAFFQUAL_PSY_A_CORE
Depressed/Anxious Complex Repair And Rhombic Flap Text: The defect edges were debeveled with a #15 scalpel blade.  The primary defect was closed partially with a complex linear closure.  Given the location of the remaining defect, shape of the defect and the proximity to free margins a rhombic flap was deemed most appropriate for complete closure of the defect.  Using a sterile surgical marker, an appropriate advancement flap was drawn incorporating the defect and placing the expected incisions within the relaxed skin tension lines where possible. The area thus outlined was incised deep to adipose tissue with a #15 scalpel blade. The skin margins were undermined to an appropriate distance in all directions utilizing iris scissors and carried over to close the primary defect.

## 2023-11-09 NOTE — CONSULT NOTE ADULT - ASSESSMENT
#MDD/anxiety/SA - treatement and mgnmt per psych - monitor qtc  check routine labs  no significant medical hx   recall prn  #MDD/anxiety/SA - treatement and mgnmt per psych - monitor qtc  check routine labs, hepatitis if not done  no significant medical hx   recall prn     #sutures in place right wrist - to be taken out 10 days from placement - per pt was in [place] 6 days ago  pt requests to continue his augmentin that was started for bronchitis - can finish 5 day course    discussed with psych team  recall prn

## 2023-11-09 NOTE — BH SAFETY PLAN - DISTRACTION PLACE 1
One place that is safe and will provide ad distraction for me would be  One place that is safe and will provide ad distraction for me would be the gym.

## 2023-11-09 NOTE — BH SAFETY PLAN - DISTRACTION PLACE 2
Another place that is safe and will provide a distraction for me would be  Another place that is safe and will provide a distraction for me would be my home.

## 2023-11-09 NOTE — BH SAFETY PLAN - WARNING SIGN 3
Also, another warning sign that a crisis is going to develop for me would be Also, another warning sign that a crisis is going to develop for me would be that I would be easily agitated.

## 2023-11-09 NOTE — BH TREATMENT PLAN - NSTXPLANTHERAPYSESSIONSFT_PSY_ALL_CORE
11-09-23  Type of therapy: Coping skills, Inspiration and motiviation, Leisure development, Self esteem, Social skills training, Stress management, Symptom management  Type of session: Individual  Level of patient participation: Resistance to participation  Duration of participation: Less than 15 minutes  Therapy conducted by: Psych rehab  Therapy Summary: Pt may need increased encouragement .

## 2023-11-09 NOTE — BH INPATIENT PSYCHIATRY ASSESSMENT NOTE - NSBHMETABOLIC_PSY_ALL_CORE_FT
BMI: BMI (kg/m2): 32.3 (11-08-23 @ 19:03)  HbA1c:   Glucose:   BP: 127/74 (11-09-23 @ 09:23) (116/71 - 153/90)  Lipid Panel: Date/Time: 11-09-23 @ 04:30  Cholesterol, Serum: 187  Direct LDL: --  HDL Cholesterol, Serum: 29  Total Cholesterol/HDL Ration Measurement: --  Triglycerides, Serum: 273   BMI: BMI (kg/m2): 32.3 (11-08-23 @ 19:03)  HbA1c: A1C with Estimated Average Glucose Result: 5.3 % (11-09-23 @ 07:00)    Glucose:   BP: 127/74 (11-09-23 @ 09:23) (116/71 - 153/90)  Lipid Panel: Date/Time: 11-09-23 @ 04:30  Cholesterol, Serum: 187  Direct LDL: --  HDL Cholesterol, Serum: 29  Total Cholesterol/HDL Ration Measurement: --  Triglycerides, Serum: 273

## 2023-11-09 NOTE — BH SAFETY PLAN - ENVIRONMENT SAFETY 2:
Calling for help once I get into one of these moods is also another great way to keep my environment safe.
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
## 2023-11-09 NOTE — BH TREATMENT PLAN - NSTXDEPRESINTERSW_PSY_ALL_CORE
Sw will provide support contacts education and referrals for healthy coping skills. Pt encouraged to attend at least 2 groups per day.

## 2023-11-10 LAB
ALBUMIN SERPL ELPH-MCNC: 4.2 G/DL — SIGNIFICANT CHANGE UP (ref 3.5–5.2)
ALBUMIN SERPL ELPH-MCNC: 4.2 G/DL — SIGNIFICANT CHANGE UP (ref 3.5–5.2)
ALP SERPL-CCNC: 75 U/L — SIGNIFICANT CHANGE UP (ref 30–115)
ALP SERPL-CCNC: 75 U/L — SIGNIFICANT CHANGE UP (ref 30–115)
ALT FLD-CCNC: 37 U/L — SIGNIFICANT CHANGE UP (ref 0–41)
ALT FLD-CCNC: 37 U/L — SIGNIFICANT CHANGE UP (ref 0–41)
ANION GAP SERPL CALC-SCNC: 10 MMOL/L — SIGNIFICANT CHANGE UP (ref 7–14)
ANION GAP SERPL CALC-SCNC: 10 MMOL/L — SIGNIFICANT CHANGE UP (ref 7–14)
AST SERPL-CCNC: 25 U/L — SIGNIFICANT CHANGE UP (ref 0–41)
AST SERPL-CCNC: 25 U/L — SIGNIFICANT CHANGE UP (ref 0–41)
BILIRUB SERPL-MCNC: 0.2 MG/DL — SIGNIFICANT CHANGE UP (ref 0.2–1.2)
BILIRUB SERPL-MCNC: 0.2 MG/DL — SIGNIFICANT CHANGE UP (ref 0.2–1.2)
BUN SERPL-MCNC: 19 MG/DL — SIGNIFICANT CHANGE UP (ref 10–20)
BUN SERPL-MCNC: 19 MG/DL — SIGNIFICANT CHANGE UP (ref 10–20)
CALCIUM SERPL-MCNC: 9.3 MG/DL — SIGNIFICANT CHANGE UP (ref 8.4–10.5)
CALCIUM SERPL-MCNC: 9.3 MG/DL — SIGNIFICANT CHANGE UP (ref 8.4–10.5)
CARBOXYTHC UR CFM-MCNC: 175 NG/ML — HIGH
CARBOXYTHC UR CFM-MCNC: 175 NG/ML — HIGH
CHLORIDE SERPL-SCNC: 100 MMOL/L — SIGNIFICANT CHANGE UP (ref 98–110)
CHLORIDE SERPL-SCNC: 100 MMOL/L — SIGNIFICANT CHANGE UP (ref 98–110)
CO2 SERPL-SCNC: 29 MMOL/L — SIGNIFICANT CHANGE UP (ref 17–32)
CO2 SERPL-SCNC: 29 MMOL/L — SIGNIFICANT CHANGE UP (ref 17–32)
CREAT SERPL-MCNC: 1 MG/DL — SIGNIFICANT CHANGE UP (ref 0.7–1.5)
CREAT SERPL-MCNC: 1 MG/DL — SIGNIFICANT CHANGE UP (ref 0.7–1.5)
EGFR: 101 ML/MIN/1.73M2 — SIGNIFICANT CHANGE UP
EGFR: 101 ML/MIN/1.73M2 — SIGNIFICANT CHANGE UP
GLUCOSE SERPL-MCNC: 121 MG/DL — HIGH (ref 70–99)
GLUCOSE SERPL-MCNC: 121 MG/DL — HIGH (ref 70–99)
HAV IGM SER-ACNC: SIGNIFICANT CHANGE UP
HAV IGM SER-ACNC: SIGNIFICANT CHANGE UP
HBV CORE IGM SER-ACNC: SIGNIFICANT CHANGE UP
HBV CORE IGM SER-ACNC: SIGNIFICANT CHANGE UP
HBV SURFACE AG SER-ACNC: SIGNIFICANT CHANGE UP
HBV SURFACE AG SER-ACNC: SIGNIFICANT CHANGE UP
HCT VFR BLD CALC: 46.8 % — SIGNIFICANT CHANGE UP (ref 42–52)
HCT VFR BLD CALC: 46.8 % — SIGNIFICANT CHANGE UP (ref 42–52)
HCV AB S/CO SERPL IA: 0.06 S/CO — SIGNIFICANT CHANGE UP (ref 0–0.99)
HCV AB S/CO SERPL IA: 0.06 S/CO — SIGNIFICANT CHANGE UP (ref 0–0.99)
HCV AB SERPL-IMP: SIGNIFICANT CHANGE UP
HCV AB SERPL-IMP: SIGNIFICANT CHANGE UP
HGB BLD-MCNC: 15.7 G/DL — SIGNIFICANT CHANGE UP (ref 14–18)
HGB BLD-MCNC: 15.7 G/DL — SIGNIFICANT CHANGE UP (ref 14–18)
MCHC RBC-ENTMCNC: 30.1 PG — SIGNIFICANT CHANGE UP (ref 27–31)
MCHC RBC-ENTMCNC: 30.1 PG — SIGNIFICANT CHANGE UP (ref 27–31)
MCHC RBC-ENTMCNC: 33.5 G/DL — SIGNIFICANT CHANGE UP (ref 32–37)
MCHC RBC-ENTMCNC: 33.5 G/DL — SIGNIFICANT CHANGE UP (ref 32–37)
MCV RBC AUTO: 89.7 FL — SIGNIFICANT CHANGE UP (ref 80–94)
MCV RBC AUTO: 89.7 FL — SIGNIFICANT CHANGE UP (ref 80–94)
NRBC # BLD: 0 /100 WBCS — SIGNIFICANT CHANGE UP (ref 0–0)
NRBC # BLD: 0 /100 WBCS — SIGNIFICANT CHANGE UP (ref 0–0)
PLATELET # BLD AUTO: 177 K/UL — SIGNIFICANT CHANGE UP (ref 130–400)
PLATELET # BLD AUTO: 177 K/UL — SIGNIFICANT CHANGE UP (ref 130–400)
PMV BLD: 10.3 FL — SIGNIFICANT CHANGE UP (ref 7.4–10.4)
PMV BLD: 10.3 FL — SIGNIFICANT CHANGE UP (ref 7.4–10.4)
POTASSIUM SERPL-MCNC: 3.8 MMOL/L — SIGNIFICANT CHANGE UP (ref 3.5–5)
POTASSIUM SERPL-MCNC: 3.8 MMOL/L — SIGNIFICANT CHANGE UP (ref 3.5–5)
POTASSIUM SERPL-SCNC: 3.8 MMOL/L — SIGNIFICANT CHANGE UP (ref 3.5–5)
POTASSIUM SERPL-SCNC: 3.8 MMOL/L — SIGNIFICANT CHANGE UP (ref 3.5–5)
PROT SERPL-MCNC: 6.7 G/DL — SIGNIFICANT CHANGE UP (ref 6–8)
PROT SERPL-MCNC: 6.7 G/DL — SIGNIFICANT CHANGE UP (ref 6–8)
RBC # BLD: 5.22 M/UL — SIGNIFICANT CHANGE UP (ref 4.7–6.1)
RBC # BLD: 5.22 M/UL — SIGNIFICANT CHANGE UP (ref 4.7–6.1)
RBC # FLD: 12.6 % — SIGNIFICANT CHANGE UP (ref 11.5–14.5)
RBC # FLD: 12.6 % — SIGNIFICANT CHANGE UP (ref 11.5–14.5)
SODIUM SERPL-SCNC: 139 MMOL/L — SIGNIFICANT CHANGE UP (ref 135–146)
SODIUM SERPL-SCNC: 139 MMOL/L — SIGNIFICANT CHANGE UP (ref 135–146)
TESTOST FREE+TOTAL PANEL SERPL-MCNC: 551 NG/DL — SIGNIFICANT CHANGE UP (ref 300–836)
TESTOST FREE+TOTAL PANEL SERPL-MCNC: 551 NG/DL — SIGNIFICANT CHANGE UP (ref 300–836)
TOXICOLOGIST REVIEW: POSITIVE — SIGNIFICANT CHANGE UP
TOXICOLOGIST REVIEW: POSITIVE — SIGNIFICANT CHANGE UP
TSH SERPL-MCNC: 2.05 UIU/ML — SIGNIFICANT CHANGE UP (ref 0.27–4.2)
TSH SERPL-MCNC: 2.05 UIU/ML — SIGNIFICANT CHANGE UP (ref 0.27–4.2)
WBC # BLD: 5.96 K/UL — SIGNIFICANT CHANGE UP (ref 4.8–10.8)
WBC # BLD: 5.96 K/UL — SIGNIFICANT CHANGE UP (ref 4.8–10.8)
WBC # FLD AUTO: 5.96 K/UL — SIGNIFICANT CHANGE UP (ref 4.8–10.8)
WBC # FLD AUTO: 5.96 K/UL — SIGNIFICANT CHANGE UP (ref 4.8–10.8)

## 2023-11-10 PROCEDURE — 99231 SBSQ HOSP IP/OBS SF/LOW 25: CPT

## 2023-11-10 RX ORDER — BACITRACIN ZINC 500 UNIT/G
1 OINTMENT IN PACKET (EA) TOPICAL
Refills: 0 | Status: DISCONTINUED | OUTPATIENT
Start: 2023-11-10 | End: 2023-11-15

## 2023-11-10 RX ADMIN — Medication 50 MILLIGRAM(S): at 20:03

## 2023-11-10 RX ADMIN — Medication 1 MILLIGRAM(S): at 08:03

## 2023-11-10 RX ADMIN — Medication 1 MILLIGRAM(S): at 09:43

## 2023-11-10 RX ADMIN — Medication 37.5 MILLIGRAM(S): at 10:22

## 2023-11-10 RX ADMIN — Medication 1 TABLET(S): at 08:02

## 2023-11-10 RX ADMIN — Medication 2 MILLIGRAM(S): at 20:04

## 2023-11-10 RX ADMIN — Medication 1 APPLICATION(S): at 08:03

## 2023-11-10 RX ADMIN — Medication 1 TABLET(S): at 20:02

## 2023-11-10 NOTE — BH INPATIENT PSYCHIATRY PROGRESS NOTE - NSBHFUPINTERVALHXFT_PSY_A_CORE
Patient was seen and evaluated this morning. Nursing reports no acute events occurred overnight. One dose of Atarax and Ativan PRN medications were administered overnight. Chart was reviewed.    Upon approach, patient is on the floor watching TV. Patient is interviewed in private. Patient is alert, oriented, and engages with interviewer. Patient is cooperative and answers all questions appropriately. Patient states that he had a little anxiety yesterday and voices that he believes the medication is not as effective. Patient also voices that he would like testosterone and states that he hasn't had it in two weeks. Patient voices that he has decreased appetite, decreased energy, feels lethargic, unmotivated, and depressed. He believes that starting his testosterone will help alleviate this. Patient still voices that his anxiety is worse in the morning and continues to have early morning awakenings. Patient states that he does not want to stop his benzodiazepine use and does not see himself off the medication in the near future. Patient denies any current passive or active suicidal ideation, intent or plan and denies any homicidal ideation. Patient denies any persecutory delusions and denies any auditory or visual hallucinations.

## 2023-11-10 NOTE — BH INPATIENT PSYCHIATRY PROGRESS NOTE - NSBHCHARTREVIEWLAB_PSY_A_CORE FT
Comprehensive Metabolic Panel in AM (11.10.23 @ 08:13)   Sodium: 139 mmol/L  Potassium: 3.8 mmol/L  Chloride: 100 mmol/L  Carbon Dioxide: 29 mmol/L  Anion Gap: 10 mmol/L  Blood Urea Nitrogen: 19 mg/dL  Creatinine: 1.0 mg/dL  Glucose: 121 mg/dL  Calcium: 9.3 mg/dL  Protein Total: 6.7 g/dL  Albumin: 4.2 g/dL  Bilirubin Total: 0.2 mg/dL  Alkaline Phosphatase: 75 U/L  Aspartate Aminotransferase (AST/SGOT): 25 U/L  Alanine Aminotransferase (ALT/SGPT): 37 U/L  eGFR: 101    Complete Blood Count in AM (11.10.23 @ 08:13)   Nucleated RBC: 0 /100 WBCs  WBC Count: 5.96 K/uL  RBC Count: 5.22 M/uL  Hemoglobin: 15.7 g/dL  Hematocrit: 46.8 %  Mean Cell Volume: 89.7 fL  Mean Cell Hemoglobin: 30.1 pg  Mean Cell Hemoglobin Conc: 33.5 g/dL  Red Cell Distrib Width: 12.6 %  Platelet Count - Automated: 177 K/uL  MPV: 10.3 fL

## 2023-11-10 NOTE — BH INPATIENT PSYCHIATRY PROGRESS NOTE - NSBHASSESSSUMMFT_PSY_ALL_CORE
Mr. Cannon is a  34 yo male, currently domiciled at home with family, currently employed, denies violence/legal history, uses cannabis, UA + for cocaine, with PMHx of low Testerone secondary to remote anabolic steroid use, PPHx of reported depression/anxiety, no inpatient hospitalizations (however seen for SI in ED on 08/01/23), no suicide attempts, attends marriage counseling, sees private psychiatrist Dr. Beck whos prescribes Alprazolam and Lexapro, was brought in by EMS accompanied by his friend to the ED after being found somnolent in a motel room and surrounded by an empty bottle of Nyquil, cyclobenzaprine tablets, Naprosyn tablets and alprazolam tablets.    Upon assessment the patient appears to be minimizing and guarded surrounding the details of his presentation and week leading up to his admission. He appears to be depressed and anxious, with evidence of bilateral wrist lacerations requiring sutures. He remains organized, oriented, and amenable to discussion. Prior collateral from ED obtained from friend and wife is concerning for recent erratic behavior, mood swings, disappearances, suicidal statements, and being found lethargic surrounded by empty medication bottles. At this time collateral believe that the patient took a bottle of Nyquil in a suicide attempt, and is not safe to be discharged. The patient's behavior is highly concerning for a suicidal attempt, and requires inpatient admission for stabilization and further evaluation. Patient will benefit from continued inpatient psychiatric hospitalization for further medication management and stabilization of symptoms. Patient will be started on venlafaxine 37.5 mg to target patient's persistent anxiety and recent exacerbation of depressive symptoms. Patient denies benefit of home Lexapro 20mg, where he requires Xanax daily for persistent anxiety, so this will be continued. In attempt to bridge to a benzodiazepine with a longer half life, patient's home Xanax will be discontinued and switched to ativan 2mg BID.     #MDD  #TRISTON with history of panic disorder  - Start venlafaxine 37.5mg daily for patient's low mood and anxiety   - 11/10 raise Ativan 2mg BID for anxiety  - Start Trazadone 50mg at bedtime  - Discontinue Lexapro 10 mg at bedtime   - Discontinue Xanax 1mg TID PRN for anxiety    #Sutures in place right wrist  - Keep dressing clean  - Remove sutures on 11/14    #History of Bronchitis  - Start 5 day course of Augmentin 875mg/125mg (day 1 of 5)    #Agitation  - For acute agitation not amenable to verbal redirection give haldol 5mg po q6h PRN, benadryl 50mg q6h PRN, ativan 1mg po q8h PRN with escalation to IM if patient is danger to self/other; if IM formulation used please order repeat EKG to ensure qtc <500ms   Mr. Cannon is a  32 yo male, currently domiciled at home with family, currently employed, denies violence/legal history, uses cannabis, UA + for cocaine, with PMHx of low Testerone secondary to remote anabolic steroid use, PPHx of reported depression/anxiety, no inpatient hospitalizations (however seen for SI in ED on 08/01/23), no suicide attempts, attends marriage counseling, sees private psychiatrist Dr. Beck whos prescribes Alprazolam and Lexapro, was brought in by EMS accompanied by his friend to the ED after being found somnolent in a motel room and surrounded by an empty bottle of Nyquil, cyclobenzaprine tablets, Naprosyn tablets and alprazolam tablets.    Upon assessment the patient appears to be minimizing and guarded surrounding the details of his presentation and week leading up to his admission. He appears to be depressed and anxious, with evidence of bilateral wrist lacerations requiring sutures. He remains organized, oriented, and amenable to discussion. Prior collateral from ED obtained from friend and wife is concerning for recent erratic behavior, mood swings, disappearances, suicidal statements, and being found lethargic surrounded by empty medication bottles. At this time collateral believe that the patient took a bottle of Nyquil in a suicide attempt, and is not safe to be discharged. The patient's behavior is highly concerning for a suicidal attempt, and requires inpatient admission for stabilization and further evaluation. Patient will benefit from continued inpatient psychiatric hospitalization for further medication management and stabilization of symptoms. Patient will be started on venlafaxine 37.5 mg to target patient's persistent anxiety and recent exacerbation of depressive symptoms. Patient denies benefit of home Lexapro 20mg, where he requires Xanax daily for persistent anxiety, so this will be continued. In attempt to bridge to a benzodiazepine with a longer half life, patient's home Xanax will be discontinued and switched to ativan 2mg BID.     Upon today's assessment, patient still appears anxious, preoccupied with the dosage of benzodiazepines and his testosterone dosage. Patient still remains guarded, minimizing the situation and his presenting symptoms. Patient denies any current passive or active suicidal ideation, intent or plan and denies any homicidal ideation. Patient denies any persecutory delusions and denies any auditory or visual hallucinations. Patient will benefit from continued inpatient psychiatric hospitalization for further medication management and stabilization of symptoms.     #MDD  #TRISTON with history of panic disorder  - Start venlafaxine 37.5mg daily for patient's low mood and anxiety   - Raise Ativan 2mg BID for anxiety  - Continue Trazadone 50mg at bedtime  - Discontinue Lexapro 10 mg at bedtime   - Discontinue Xanax 1mg TID PRN for anxiety    #Sutures in place left wrist  #Steri-strips on right wrist  - Keep dressing clean  - Remove sutures on 11/14    #History of Bronchitis  - Start 5 day course of Augmentin 875mg/125mg (day 2 of 5)    #Agitation  - For acute agitation not amenable to verbal redirection give haldol 5mg po q6h PRN, benadryl 50mg q6h PRN, ativan 1mg po q8h PRN with escalation to IM if patient is danger to self/other; if IM formulation used please order repeat EKG to ensure qtc <500ms   Mr. Cannon is a  32 yo male, currently domiciled at home with family, currently employed, denies violence/legal history, uses cannabis, UA + for cocaine, with PMHx of low Testerone secondary to remote anabolic steroid use, PPHx of reported depression/anxiety, no inpatient hospitalizations (however seen for SI in ED on 08/01/23), no suicide attempts, attends marriage counseling, sees private psychiatrist Dr. Beck whos prescribes Alprazolam and Lexapro, was brought in by EMS accompanied by his friend to the ED after being found somnolent in a motel room and surrounded by an empty bottle of Nyquil, cyclobenzaprine tablets, Naprosyn tablets and alprazolam tablets.    Upon assessment the patient appears to be minimizing and guarded surrounding the details of his presentation and week leading up to his admission. He appears to be depressed and anxious, with evidence of bilateral wrist lacerations requiring sutures. He remains organized, oriented, and amenable to discussion. Prior collateral from ED obtained from friend and wife is concerning for recent erratic behavior, mood swings, disappearances, suicidal statements, and being found lethargic surrounded by empty medication bottles. At this time collateral believe that the patient took a bottle of Nyquil in a suicide attempt, and is not safe to be discharged. The patient's behavior is highly concerning for a suicidal attempt, and requires inpatient admission for stabilization and further evaluation. Patient will benefit from continued inpatient psychiatric hospitalization for further medication management and stabilization of symptoms. Patient will be started on venlafaxine 37.5 mg to target patient's persistent anxiety and recent exacerbation of depressive symptoms. Patient denies benefit of home Lexapro 20mg, where he requires Xanax daily for persistent anxiety, so this will be continued. In attempt to bridge to a benzodiazepine with a longer half life, patient's home Xanax will be discontinued and switched to ativan 2mg BID.     Upon today's assessment, patient still appears anxious, preoccupied with the dosage of benzodiazepines and his testosterone dosage. Patient still remains guarded, minimizing the situation and his presenting symptoms. Patient denies any current passive or active suicidal ideation, intent or plan and denies any homicidal ideation. Patient denies any persecutory delusions and denies any auditory or visual hallucinations. Patient will benefit from continued inpatient psychiatric hospitalization for further medication management and stabilization of symptoms.     #MDD  #TRISTON with history of panic disorder  - continue venlafaxine 37.5mg daily for patient's low mood and anxiety   - Raise Ativan 2mg BID for anxiety  - Continue Trazadone 50mg at bedtime  - Discontinue Lexapro 20 mg at bedtime   - Discontinue Xanax 1mg TID PRN for anxiety    #Sutures in place left wrist  #Steri-strips on right wrist  - Keep dressing clean  - Remove sutures on 11/14    #History of Bronchitis  - Start 5 day course of Augmentin 875mg/125mg (day 2 of 5)    #Agitation  - For acute agitation not amenable to verbal redirection give haldol 5mg po q6h PRN, benadryl 50mg q6h PRN, ativan 1mg po q8h PRN with escalation to IM if patient is danger to self/other; if IM formulation used please order repeat EKG to ensure qtc <500ms

## 2023-11-10 NOTE — BH INPATIENT PSYCHIATRY PROGRESS NOTE - CURRENT MEDICATION
MEDICATIONS  (STANDING):  amoxicillin  875 milliGRAM(s)/clavulanate 1 Tablet(s) Oral two times a day  LORazepam     Tablet 2 milliGRAM(s) Oral two times a day  traZODone 50 milliGRAM(s) Oral at bedtime  venlafaxine 37.5 milliGRAM(s) Oral daily    MEDICATIONS  (PRN):  acetaminophen     Tablet .. 650 milliGRAM(s) Oral every 6 hours PRN Moderate Pain (4 - 6)  aluminum hydroxide/magnesium hydroxide/simethicone Suspension 30 milliLiter(s) Oral every 6 hours PRN Dyspepsia  bacitracin   Ointment 1 Application(s) Topical two times a day PRN wound  diphenhydrAMINE 50 milliGRAM(s) Oral every 6 hours PRN Extrapyramidal prophylaxis  haloperidol     Tablet 5 milliGRAM(s) Oral every 6 hours PRN agitation  hydrOXYzine hydrochloride 50 milliGRAM(s) Oral every 6 hours PRN anxiety  LORazepam     Tablet 1 milliGRAM(s) Oral every 8 hours PRN severe anxiety  melatonin. 5 milliGRAM(s) Oral at bedtime PRN Insomnia  vitamin A &amp; D Ointment 1 Application(s) Topical three times a day PRN dry skin

## 2023-11-10 NOTE — BH CHART NOTE - NSEVENTNOTEFT_PSY_ALL_CORE
Patient explains he was prescribed Testosterone cypionate 200mg/1mL biweekly for low testosterone. Due to insurance issues, patient has been ordering testosterone cypionate 200mg/1mL from [facility], and self-amdminstering biweekly Patient explains he was prescribed Testosterone cypionate 200mg/1mL biweekly for low testosterone. Due to insurance issues, patient has been ordering testosterone cypionate 200mg/1mL [facility] and self-administering biweekly. His wife brought in the bottle on 11/10 and is being sent to pharmacy for safe storage.  Patient explains he was prescribed Testosterone cypionate 200mg/1mL biweekly for low testosterone. Due to insurance issues, patient has been ordering testosterone cypionate 200mg/1mL from [facility] and self-administering biweekly. His wife brought in the bottle on 11/10 and is being sent to pharmacy, who placed it in safe for storage.

## 2023-11-11 LAB
TESTOST FREE SERPL-MCNC: 14.4 PG/ML — SIGNIFICANT CHANGE UP (ref 9.1–32.2)
TESTOST FREE SERPL-MCNC: 14.4 PG/ML — SIGNIFICANT CHANGE UP (ref 9.1–32.2)

## 2023-11-11 PROCEDURE — 99231 SBSQ HOSP IP/OBS SF/LOW 25: CPT

## 2023-11-11 RX ADMIN — Medication 37.5 MILLIGRAM(S): at 08:08

## 2023-11-11 RX ADMIN — Medication 3 MILLIGRAM(S): at 20:17

## 2023-11-11 RX ADMIN — Medication 2 MILLIGRAM(S): at 08:05

## 2023-11-11 RX ADMIN — Medication 1 MILLIGRAM(S): at 11:22

## 2023-11-11 RX ADMIN — Medication 50 MILLIGRAM(S): at 20:16

## 2023-11-11 RX ADMIN — Medication 1 TABLET(S): at 08:06

## 2023-11-11 RX ADMIN — Medication 1 TABLET(S): at 20:16

## 2023-11-11 NOTE — BH INPATIENT PSYCHIATRY PROGRESS NOTE - CURRENT MEDICATION
MEDICATIONS  (STANDING):  amoxicillin  875 milliGRAM(s)/clavulanate 1 Tablet(s) Oral two times a day  LORazepam     Tablet 3 milliGRAM(s) Oral two times a day  traZODone 50 milliGRAM(s) Oral at bedtime  venlafaxine 37.5 milliGRAM(s) Oral daily    MEDICATIONS  (PRN):  acetaminophen     Tablet .. 650 milliGRAM(s) Oral every 6 hours PRN Moderate Pain (4 - 6)  aluminum hydroxide/magnesium hydroxide/simethicone Suspension 30 milliLiter(s) Oral every 6 hours PRN Dyspepsia  bacitracin   Ointment 1 Application(s) Topical two times a day PRN wound  diphenhydrAMINE 50 milliGRAM(s) Oral every 6 hours PRN Extrapyramidal prophylaxis  haloperidol     Tablet 5 milliGRAM(s) Oral every 6 hours PRN agitation  hydrOXYzine hydrochloride 50 milliGRAM(s) Oral every 6 hours PRN anxiety  LORazepam     Tablet 1 milliGRAM(s) Oral every 8 hours PRN severe anxiety  melatonin. 5 milliGRAM(s) Oral at bedtime PRN Insomnia  vitamin A &amp; D Ointment 1 Application(s) Topical three times a day PRN dry skin

## 2023-11-11 NOTE — BH INPATIENT PSYCHIATRY PROGRESS NOTE - NSBHASSESSSUMMFT_PSY_ALL_CORE
Mr. Cannon is a  32 yo male, currently domiciled at home with family, currently employed, denies violence/legal history, uses cannabis, UA + for cocaine, with PMHx of low Testerone secondary to remote anabolic steroid use, PPHx of reported depression/anxiety, no inpatient hospitalizations (however seen for SI in ED on 23), no suicide attempts, attends marriage counseling, sees private psychiatrist Dr. Beck whos prescribes Alprazolam and Lexapro, was brought in by EMS accompanied by his friend to the ED after being found somnolent in a motel room and surrounded by an empty bottle of Nyquil, cyclobenzaprine tablets, Naprosyn tablets and alprazolam tablets.    Upon assessment the patient appears to be minimizing and guarded surrounding the details of his presentation and week leading up to his admission. He appears to be depressed and anxious, with evidence of bilateral wrist lacerations requiring sutures. He remains organized, oriented, and amenable to discussion. Prior collateral from ED obtained from friend and wife is concerning for recent erratic behavior, mood swings, disappearances, suicidal statements, and being found lethargic surrounded by empty medication bottles. At this time collateral believe that the patient took a bottle of Nyquil in a suicide attempt, and is not safe to be discharged. The patient's behavior is highly concerning for a suicidal attempt, and requires inpatient admission for stabilization and further evaluation. Patient will benefit from continued inpatient psychiatric hospitalization for further medication management and stabilization of symptoms. Patient will be started on venlafaxine 37.5 mg to target patient's persistent anxiety and recent exacerbation of depressive symptoms. Patient denies benefit of home Lexapro 20mg, where he requires Xanax daily for persistent anxiety, so this will be continued. In attempt to bridge to a benzodiazepine with a longer half life, patient's home Xanax will be discontinued and switched to ativan 2mg BID.     Upon today's assessment, patient still appears anxious, preoccupied with the dosage of benzodiazepines and his testosterone dosage. Patient still remains guarded, minimizing the situation and his presenting symptoms. Patient denies any current passive or active suicidal ideation, intent or plan and denies any homicidal ideation. Patient denies any persecutory delusions and denies any auditory or visual hallucinations. Patient will benefit from continued inpatient psychiatric hospitalization for further medication management and stabilization of symptoms.      2023    The patient's dose of Ativan was increased from 2 mg twice daily to 3 mg twice daily  .A review of the record indicates that the medication and dosage he was taking i.e. Xanax 3 mg a day was corroborated both by his prescriber and the pharmacy.   Similarly notes reflect the fact that he does not want to stop the medication.  Lastly there has been a slow but steady increase in the amount of Ativan he has been getting.   He has apparently been taking Xanax for years and most clinicians would say that Xanax is  the most difficult of the Benzodiazapines to discontinue.  While in part this may be due to the fact that being so concentrated is hard to reduce doses and small steps.  However based on the  writer's experience, detoxing from Xanax is associated with more relapses, agitation and seizures than other benzos.   2 patient's have had seizures coming off Xanax on the unit in the past year.   In addition 1 of those patients, who is highly motivated to get off Xanax, whose family is highly motivated for him to get off of Xanax,    whose inpatient treatment team was highly motivated to get him off Xanax and  lastly whose is treating psychiatrist is highly motivated to get him off Xanax, remains on Xanax.       Notably discomfort and withdrawal events like seizures frequently  are not preceded by visible signs of withdrawal.   Depending on which conversion tables  are utilized the general conversion rate for  alprazolam Ativan is between 0.5 m-2 mg of Ativan.  The conversion from alprazolam to Valium .  1:10-1:20.   therefore, this patient's dose of Xanax was equivalent to 30 to 60 mg of Valium. Conversion to Valium can be useful since it's withdrawal  symptoms are well-known. Significant withdrawal occurs at about 40 mg and seizures at 60 to 80 mg.  reducing his dose, at first by two thirds( 2 mg twice daily)  was the equivalent of going from 60 mg of Valium to 20 mg.   After it was raised  to 4 mg a day it was the  reduction  still was equivalent of going from 60 mg to 40 mg.   With chronic administration, a drop of that level would clearly cause tremendous anxiety and possibly physical symptoms.   For that reason  Ativan was increased to 3 mg twice a day was 6 mg daily which would be the minimum  estimated equivalent to his prior dose of  Xanax.      #MDD  #TRISTON with history of panic disorder  - continue venlafaxine 37.5mg daily for patient's low mood and anxiety   - Raise Ativan 2mg BID for anxiety  - Continue Trazadone 50mg at bedtime  - Discontinue Lexapro 20 mg at bedtime   - Discontinue Xanax 1mg TID PRN for anxiety    #Sutures in place left wrist  #Steri-strips on right wrist  - Keep dressing clean  - Remove sutures on     #History of Bronchitis  - Start 5 day course of Augmentin 875mg/125mg (day 2 of 5)    #Agitation  - For acute agitation not amenable to verbal redirection give haldol 5mg po q6h PRN, benadryl 50mg q6h PRN, ativan 1mg po q8h PRN with escalation to IM if patient is danger to self/other; if IM formulation used please order repeat EKG to ensure qtc <500ms

## 2023-11-11 NOTE — BH INPATIENT PSYCHIATRY PROGRESS NOTE - NSBHFUPINTERVALHXFT_PSY_A_CORE
Chart reviewed staff interviewed.   Met with patient who reported that he was extremely anxious and that while he is willing to switch to what is being told is a safer medicine he does not want to stop what he sees as his antianxiety medication.   He reports that he was told that if an initial dose does not work (of the Ativan replacing the Xanax) that it would be raised.   The chart seem to corroborate his statements and Ativan was raised to 3 mg twice daily.   (PLEASE SEE ASSESSMENT)

## 2023-11-12 RX ADMIN — Medication 1 TABLET(S): at 08:01

## 2023-11-12 RX ADMIN — Medication 50 MILLIGRAM(S): at 20:17

## 2023-11-12 RX ADMIN — Medication 1 TABLET(S): at 20:17

## 2023-11-12 RX ADMIN — Medication 3 MILLIGRAM(S): at 08:01

## 2023-11-12 RX ADMIN — Medication 3 MILLIGRAM(S): at 20:19

## 2023-11-12 RX ADMIN — Medication 37.5 MILLIGRAM(S): at 08:00

## 2023-11-12 NOTE — BH INPATIENT PSYCHIATRY PROGRESS NOTE - NSBHFUPINTERVALHXFT_PSY_A_CORE
Chart reviewed staff interviewed.  discussed with staff and   patient  evaluated. No acute event  overnight.  slept well.  compliant with medications , reports that medications are helping him . denies feeling depress . denies s/h ideations  intent or plan denies  a/v hallucination

## 2023-11-13 PROCEDURE — 99231 SBSQ HOSP IP/OBS SF/LOW 25: CPT

## 2023-11-13 RX ORDER — VENLAFAXINE HCL 75 MG
75 CAPSULE, EXT RELEASE 24 HR ORAL DAILY
Refills: 0 | Status: DISCONTINUED | OUTPATIENT
Start: 2023-11-13 | End: 2023-11-13

## 2023-11-13 RX ORDER — VENLAFAXINE HCL 75 MG
75 CAPSULE, EXT RELEASE 24 HR ORAL DAILY
Refills: 0 | Status: DISCONTINUED | OUTPATIENT
Start: 2023-11-13 | End: 2023-11-15

## 2023-11-13 RX ADMIN — Medication 1 TABLET(S): at 08:18

## 2023-11-13 RX ADMIN — Medication 37.5 MILLIGRAM(S): at 08:18

## 2023-11-13 RX ADMIN — Medication 50 MILLIGRAM(S): at 20:17

## 2023-11-13 RX ADMIN — Medication 3 MILLIGRAM(S): at 20:17

## 2023-11-13 RX ADMIN — Medication 1 TABLET(S): at 20:17

## 2023-11-13 RX ADMIN — Medication 3 MILLIGRAM(S): at 08:17

## 2023-11-13 NOTE — BH INPATIENT PSYCHIATRY PROGRESS NOTE - NSBHFUPINTERVALHXFT_PSY_A_CORE
Patient was seen and evaluated this morning. Nursing reports no acute events occurred overnight and over the weekend. No PRN medications were administered overnight. Chart was reviewed.    Upon approach, patient is on the floor watching TV. Patient is interviewed in private. Patient is alert, oriented, and engages with interviewer. Patient is cooperative and answers all questions appropriately. Patient states that he is "feeling good" and that he is feeling less anxious. Patient states that he likes the current medication dosage and feels like it is working well for him. Patient states he would like testosterone and he hasn't had it in two weeks. Patient voices he has decreased energy, motivation, and libido and believes that starting his testosterone will help alleviate this. Patient denies any current passive or active suicidal ideation, intent or plan and denies any homicidal ideation. Patient denies any persecutory delusions and denies any auditory or visual hallucinations.   Patient was seen and evaluated this morning. Nursing reports no acute events occurred overnight and over the weekend. No PRN medications were administered overnight. Chart was reviewed.    Upon approach, patient is on the floor watching TV. Patient is interviewed in private. Patient is alert, oriented, and engages with interviewer. Patient is cooperative and answers all questions appropriately. Patient states that he is "feeling good" and that he is feeling less anxious. Patient states that he likes the current medication dosage and feels like it is working well for him. Patient states he would like testosterone and he hasn't had it in two weeks. Patient voices he has decreased energy, motivation, and libido and believes that re-starting his testosterone will help alleviate this. Patient denies any current passive or active suicidal ideation, intent or plan and denies any homicidal ideation. Patient denies any persecutory delusions and denies any auditory or visual hallucinations.

## 2023-11-13 NOTE — BH INPATIENT PSYCHIATRY PROGRESS NOTE - NSBHASSESSSUMMFT_PSY_ALL_CORE
Mr. Cannon is a  34 yo male, currently domiciled at home with family, currently employed, denies violence/legal history, uses cannabis, UA + for cocaine, with PMHx of low Testerone secondary to remote anabolic steroid use, PPHx of reported depression/anxiety, no inpatient hospitalizations (however seen for SI in ED on 23), no suicide attempts, attends marriage counseling, sees private psychiatrist Dr. Beck whos prescribes Alprazolam and Lexapro, was brought in by EMS accompanied by his friend to the ED after being found somnolent in a motel room and surrounded by an empty bottle of Nyquil, cyclobenzaprine tablets, Naprosyn tablets and alprazolam tablets.    Upon assessment the patient appears to be minimizing and guarded surrounding the details of his presentation and week leading up to his admission. He appears to be depressed and anxious, with evidence of bilateral wrist lacerations requiring sutures. He remains organized, oriented, and amenable to discussion. Prior collateral from ED obtained from friend and wife is concerning for recent erratic behavior, mood swings, disappearances, suicidal statements, and being found lethargic surrounded by empty medication bottles. At this time collateral believe that the patient took a bottle of Nyquil in a suicide attempt, and is not safe to be discharged. The patient's behavior is highly concerning for a suicidal attempt, and requires inpatient admission for stabilization and further evaluation. Patient will benefit from continued inpatient psychiatric hospitalization for further medication management and stabilization of symptoms. Patient will be started on venlafaxine 37.5 mg to target patient's persistent anxiety and recent exacerbation of depressive symptoms. Patient denies benefit of home Lexapro 20mg, where he requires Xanax daily for persistent anxiety, so this will be continued. In attempt to bridge to a benzodiazepine with a longer half life, patient's home Xanax will be discontinued and switched to ativan 2mg BID.      2023    The patient's dose of Ativan was increased from 2 mg twice daily to 3 mg twice daily  .A review of the record indicates that the medication and dosage he was taking i.e. Xanax 3 mg a day was corroborated both by his prescriber and the pharmacy.   Similarly notes reflect the fact that he does not want to stop the medication.  Lastly there has been a slow but steady increase in the amount of Ativan he has been getting.   He has apparently been taking Xanax for years and most clinicians would say that Xanax is  the most difficult of the Benzodiazapines to discontinue.  While in part this may be due to the fact that being so concentrated is hard to reduce doses and small steps.  However based on the  writer's experience, detoxing from Xanax is associated with more relapses, agitation and seizures than other benzos.   2 patient's have had seizures coming off Xanax on the unit in the past year.   In addition 1 of those patients, who is highly motivated to get off Xanax, whose family is highly motivated for him to get off of Xanax,    whose inpatient treatment team was highly motivated to get him off Xanax and  lastly whose is treating psychiatrist is highly motivated to get him off Xanax, remains on Xanax.       Notably discomfort and withdrawal events like seizures frequently  are not preceded by visible signs of withdrawal.   Depending on which conversion tables  are utilized the general conversion rate for  alprazolam Ativan is between 0.5 m-2 mg of Ativan.  The conversion from alprazolam to Valium .  1:10-1:20.   therefore, this patient's dose of Xanax was equivalent to 30 to 60 mg of Valium. Conversion to Valium can be useful since it's withdrawal  symptoms are well-known. Significant withdrawal occurs at about 40 mg and seizures at 60 to 80 mg.  reducing his dose, at first by two thirds( 2 mg twice daily)  was the equivalent of going from 60 mg of Valium to 20 mg.   After it was raised  to 4 mg a day it was the  reduction  still was equivalent of going from 60 mg to 40 mg.   With chronic administration, a drop of that level would clearly cause tremendous anxiety and possibly physical symptoms.   For that reason  Ativan was increased to 3 mg twice a day was 6 mg daily which would be the minimum  estimated equivalent to his prior dose of  Xanax.    Upon today's assessment, patient appears less anxious. Venlafaxine will be raised to 75 mg daily. Patient will benefit from continued inpatient psychiatric hospitalization for further medication management and stabilization of symptoms.         #MDD  #TRISTON with history of panic disorder  - Raise venlafaxine 75mg daily for patient's low mood and anxiety   - C/w Ativan 3mg BID for anxiety  - Continue Trazadone 50mg at bedtime  - Discontinue Lexapro 20 mg at bedtime   - Discontinue Xanax 1mg TID PRN for anxiety    #Sutures in place left wrist  #Steri-strips on right wrist  - Keep dressing clean  - Remove sutures on     #History of Bronchitis  - Start 5 day course of Augmentin 875mg/125mg (day 2 of 5)    #Agitation  - For acute agitation not amenable to verbal redirection give haldol 5mg po q6h PRN, benadryl 50mg q6h PRN, ativan 1mg po q8h PRN with escalation to IM if patient is danger to self/other; if IM formulation used please order repeat EKG to ensure qtc <500ms   Mr. Cannon is a  34 yo male, currently domiciled at home with family, currently employed, denies violence/legal history, uses cannabis, UA + for cocaine, with PMHx of low Testerone secondary to remote anabolic steroid use, PPHx of reported depression/anxiety, no inpatient hospitalizations (however seen for SI in ED on 08/01/23), no suicide attempts, attends marriage counseling, sees private psychiatrist Dr. Beck whos prescribes Alprazolam and Lexapro, was brought in by EMS accompanied by his friend to the ED after being found somnolent in a motel room and surrounded by an empty bottle of Nyquil, cyclobenzaprine tablets, Naprosyn tablets and alprazolam tablets.    Upon assessment the patient appears to be minimizing and guarded surrounding the details of his presentation and week leading up to his admission. He appears to be depressed and anxious, with evidence of bilateral wrist lacerations requiring sutures. He remains organized, oriented, and amenable to discussion. Prior collateral from ED obtained from friend and wife is concerning for recent erratic behavior, mood swings, disappearances, suicidal statements, and being found lethargic surrounded by empty medication bottles. At this time collateral believe that the patient took a bottle of Nyquil in a suicide attempt, and is not safe to be discharged. The patient's behavior is highly concerning for a suicidal attempt, and requires inpatient admission for stabilization and further evaluation. Patient will benefit from continued inpatient psychiatric hospitalization for further medication management and stabilization of symptoms. Patient will be started on venlafaxine 37.5 mg to target patient's persistent anxiety and recent exacerbation of depressive symptoms. Patient denies benefit of home Lexapro 20mg, where he requires Xanax daily for persistent anxiety, so this will be continued. In attempt to bridge to a benzodiazepine with a longer half life, patient's home Xanax will be discontinued and switched to ativan 2mg BID.     Upon today's assessment, patient appears less anxious. Venlafaxine will be raised to 75 mg daily, as patient still experiencing low mood. Ativan will be continued at 3mg BID, due to patient's controlled anxiety. Patient denies any current passive or active suicidal ideation, intent or plan and denies any homicidal ideation. Patient denies any persecutory delusions and denies any auditory or visual hallucinations. Patient will benefit from continued inpatient psychiatric hospitalization for further medication management and stabilization of symptoms.       #MDD  #TRISTON with history of panic disorder  - Raise venlafaxine to 75mg daily for patient's low mood and anxiety   - Continue Ativan 3mg BID for anxiety  - Continue Trazadone 50mg at bedtime  - Discontinue Lexapro 20 mg at bedtime   - Discontinue Xanax 1mg TID PRN for anxiety    #Sutures in place left wrist  #Steri-strips on right wrist  - Keep dressing clean  - Remove sutures on 11/14    #History of Bronchitis  - Start 5 day course of Augmentin 875mg/125mg (day 4 of 5)    #Agitation  - For acute agitation not amenable to verbal redirection give haldol 5mg po q6h PRN, benadryl 50mg q6h PRN, ativan 1mg po q8h PRN with escalation to IM if patient is danger to self/other; if IM formulation used please order repeat EKG to ensure qtc <500ms

## 2023-11-13 NOTE — BH INPATIENT PSYCHIATRY PROGRESS NOTE - CURRENT MEDICATION
MEDICATIONS  (STANDING):  amoxicillin  875 milliGRAM(s)/clavulanate 1 Tablet(s) Oral two times a day  LORazepam     Tablet 3 milliGRAM(s) Oral two times a day  traZODone 50 milliGRAM(s) Oral at bedtime  venlafaxine 37.5 milliGRAM(s) Oral daily    MEDICATIONS  (PRN):  acetaminophen     Tablet .. 650 milliGRAM(s) Oral every 6 hours PRN Moderate Pain (4 - 6)  aluminum hydroxide/magnesium hydroxide/simethicone Suspension 30 milliLiter(s) Oral every 6 hours PRN Dyspepsia  bacitracin   Ointment 1 Application(s) Topical two times a day PRN wound  diphenhydrAMINE 50 milliGRAM(s) Oral every 6 hours PRN Extrapyramidal prophylaxis  haloperidol     Tablet 5 milliGRAM(s) Oral every 6 hours PRN agitation  hydrOXYzine hydrochloride 50 milliGRAM(s) Oral every 6 hours PRN anxiety  LORazepam     Tablet 1 milliGRAM(s) Oral every 8 hours PRN severe anxiety  melatonin. 5 milliGRAM(s) Oral at bedtime PRN Insomnia  vitamin A &amp; D Ointment 1 Application(s) Topical three times a day PRN dry skin   MEDICATIONS  (STANDING):  amoxicillin  875 milliGRAM(s)/clavulanate 1 Tablet(s) Oral two times a day  LORazepam     Tablet 3 milliGRAM(s) Oral two times a day  traZODone 50 milliGRAM(s) Oral at bedtime  venlafaxine XR 75 milliGRAM(s) Oral daily    MEDICATIONS  (PRN):  acetaminophen     Tablet .. 650 milliGRAM(s) Oral every 6 hours PRN Moderate Pain (4 - 6)  aluminum hydroxide/magnesium hydroxide/simethicone Suspension 30 milliLiter(s) Oral every 6 hours PRN Dyspepsia  bacitracin   Ointment 1 Application(s) Topical two times a day PRN wound  diphenhydrAMINE 50 milliGRAM(s) Oral every 6 hours PRN Extrapyramidal prophylaxis  haloperidol     Tablet 5 milliGRAM(s) Oral every 6 hours PRN agitation  hydrOXYzine hydrochloride 50 milliGRAM(s) Oral every 6 hours PRN anxiety  LORazepam     Tablet 1 milliGRAM(s) Oral every 8 hours PRN severe anxiety  melatonin. 5 milliGRAM(s) Oral at bedtime PRN Insomnia  vitamin A &amp; D Ointment 1 Application(s) Topical three times a day PRN dry skin

## 2023-11-13 NOTE — BH INPATIENT PSYCHIATRY PROGRESS NOTE - NSBHCHARTREVIEWLAB_PSY_A_CORE FT
estosterone, Free and Total (11.10.23 @ 08:30)   Testosterone, Free: 14.4 pg/mL  Testosterone, Total Serum: 551.0: Male Testosterone Reference Range   Roberto Stage 1 ---------- <2.5 ng/dL   Roberto Stage 2 ---------- <2.50 - 432 ng/dL   Roberto Stage 3 ---------- 64.9 - 778 ng/dL   Roberto Stage 4 ---------- 180 - 763 ng/dL   Roberto Stage 5 ---------- 188 - 882 ng/dL   18- 49 years ---------- 300 - 836 ng/dL   >/= 50 years ---------- 300 - 740 ng/dL   Female Testosterone Reference Range   Roberto Stage 1 ---------- <2.5 - 6.12 ng/dL   Roberto Stage 2 ---------- <2.50 - 10.4 ng/dL   Roberto Stage 3 ---------- <2.50 - 23.7 ng/dL   Roberto Stage 4 ---------- <2.50 - 26.8 ng/dL   Roberto Stage 5 ---------- 4.60 - 38.3 ng/dL   18 - 49 years ---------- 8.4 - 48.1 ng/dL   >/= 50 years ---------- 2.9 - 40.8 ng/dL   Interpret testosterone concentrations less than 12.0 ng/dL with caution   as the coefficient of variation in the low range is greater than 20%. ng/dL    Comprehensive Metabolic Panel in AM (11.10.23 @ 08:13)   Sodium: 139 mmol/L  Potassium: 3.8 mmol/L  Chloride: 100 mmol/L  Carbon Dioxide: 29 mmol/L  Anion Gap: 10 mmol/L  Blood Urea Nitrogen: 19 mg/dL  Creatinine: 1.0 mg/dL  Glucose: 121 mg/dL  Calcium: 9.3 mg/dL  Protein Total: 6.7 g/dL  Albumin: 4.2 g/dL  Bilirubin Total: 0.2 mg/dL  Alkaline Phosphatase: 75 U/L  Aspartate Aminotransferase (AST/SGOT): 25 U/L  Alanine Aminotransferase (ALT/SGPT): 37 U/L  eGFR: 101       Testosterone, Free and Total (11.10.23 @ 08:30)   Testosterone, Free: 14.4 pg/mL  Testosterone, Total Serum: 551.0: Male Testosterone Reference Range   Roberto Stage 1 ---------- <2.5 ng/dL   Roberto Stage 2 ---------- <2.50 - 432 ng/dL   Roberto Stage 3 ---------- 64.9 - 778 ng/dL   Roberto Stage 4 ---------- 180 - 763 ng/dL   Roberto Stage 5 ---------- 188 - 882 ng/dL   18- 49 years ---------- 300 - 836 ng/dL   >/= 50 years ---------- 300 - 740 ng/dL   Female Testosterone Reference Range   Roberto Stage 1 ---------- <2.5 - 6.12 ng/dL   Roberto Stage 2 ---------- <2.50 - 10.4 ng/dL   Roberto Stage 3 ---------- <2.50 - 23.7 ng/dL   Roberto Stage 4 ---------- <2.50 - 26.8 ng/dL   Roberto Stage 5 ---------- 4.60 - 38.3 ng/dL   18 - 49 years ---------- 8.4 - 48.1 ng/dL   >/= 50 years ---------- 2.9 - 40.8 ng/dL   Interpret testosterone concentrations less than 12.0 ng/dL with caution   as the coefficient of variation in the low range is greater than 20%. ng/dL    Comprehensive Metabolic Panel in AM (11.10.23 @ 08:13)   Sodium: 139 mmol/L  Potassium: 3.8 mmol/L  Chloride: 100 mmol/L  Carbon Dioxide: 29 mmol/L  Anion Gap: 10 mmol/L  Blood Urea Nitrogen: 19 mg/dL  Creatinine: 1.0 mg/dL  Glucose: 121 mg/dL  Calcium: 9.3 mg/dL  Protein Total: 6.7 g/dL  Albumin: 4.2 g/dL  Bilirubin Total: 0.2 mg/dL  Alkaline Phosphatase: 75 U/L  Aspartate Aminotransferase (AST/SGOT): 25 U/L  Alanine Aminotransferase (ALT/SGPT): 37 U/L  eGFR: 101

## 2023-11-14 PROCEDURE — 99231 SBSQ HOSP IP/OBS SF/LOW 25: CPT

## 2023-11-14 RX ORDER — ESCITALOPRAM OXALATE 10 MG/1
1 TABLET, FILM COATED ORAL
Refills: 0 | DISCHARGE

## 2023-11-14 RX ORDER — ALPRAZOLAM 0.25 MG
1 TABLET ORAL
Refills: 0 | DISCHARGE

## 2023-11-14 RX ORDER — VENLAFAXINE HCL 75 MG
1 CAPSULE, EXT RELEASE 24 HR ORAL
Qty: 30 | Refills: 0
Start: 2023-11-14 | End: 2023-12-13

## 2023-11-14 RX ORDER — TRAZODONE HCL 50 MG
1 TABLET ORAL
Qty: 30 | Refills: 0
Start: 2023-11-14 | End: 2023-12-13

## 2023-11-14 RX ADMIN — Medication 1 APPLICATION(S): at 12:40

## 2023-11-14 RX ADMIN — Medication 50 MILLIGRAM(S): at 19:59

## 2023-11-14 RX ADMIN — Medication 75 MILLIGRAM(S): at 08:06

## 2023-11-14 RX ADMIN — Medication 3 MILLIGRAM(S): at 20:00

## 2023-11-14 RX ADMIN — Medication 3 MILLIGRAM(S): at 08:06

## 2023-11-14 RX ADMIN — Medication 1 TABLET(S): at 08:06

## 2023-11-14 NOTE — BH INPATIENT PSYCHIATRY PROGRESS NOTE - NSICDXBHSECONDARYDX_PSY_ALL_CORE
Generalized anxiety disorder   F41.1  History of panic disorder   Z86.59  

## 2023-11-14 NOTE — BH INPATIENT PSYCHIATRY PROGRESS NOTE - NSBHMETABOLIC_PSY_ALL_CORE_FT
BMI: BMI (kg/m2): 32.3 (11-08-23 @ 19:03)  HbA1c: A1C with Estimated Average Glucose Result: 5.3 % (11-09-23 @ 07:00)    Glucose:   BP: 144/82 (11-10-23 @ 17:02) (116/71 - 182/82)  Lipid Panel: Date/Time: 11-09-23 @ 04:30  Cholesterol, Serum: 187  Direct LDL: --  HDL Cholesterol, Serum: 29  Total Cholesterol/HDL Ration Measurement: --  Triglycerides, Serum: 273  
BMI: BMI (kg/m2): 32.3 (11-08-23 @ 19:03)  HbA1c: A1C with Estimated Average Glucose Result: 5.3 % (11-09-23 @ 07:00)    Glucose:   BP: 169/79 (11-14-23 @ 08:35) (129/68 - 174/86)  Lipid Panel: Date/Time: 11-09-23 @ 04:30  Cholesterol, Serum: 187  Direct LDL: --  HDL Cholesterol, Serum: 29  Total Cholesterol/HDL Ration Measurement: --  Triglycerides, Serum: 273  
BMI: BMI (kg/m2): 32.3 (11-08-23 @ 19:03)  HbA1c: A1C with Estimated Average Glucose Result: 5.3 % (11-09-23 @ 07:00)    Glucose:   BP: 182/82 (11-10-23 @ 08:40) (116/71 - 182/82)  Lipid Panel: Date/Time: 11-09-23 @ 04:30  Cholesterol, Serum: 187  Direct LDL: --  HDL Cholesterol, Serum: 29  Total Cholesterol/HDL Ration Measurement: --  Triglycerides, Serum: 273  
BMI: BMI (kg/m2): 32.3 (11-08-23 @ 19:03)  HbA1c: A1C with Estimated Average Glucose Result: 5.3 % (11-09-23 @ 07:00)    Glucose:   BP: 139/87 (11-12-23 @ 08:45) (131/80 - 182/82)  Lipid Panel: Date/Time: 11-09-23 @ 04:30  Cholesterol, Serum: 187  Direct LDL: --  HDL Cholesterol, Serum: 29  Total Cholesterol/HDL Ration Measurement: --  Triglycerides, Serum: 273  
BMI: BMI (kg/m2): 32.3 (11-08-23 @ 19:03)  HbA1c: A1C with Estimated Average Glucose Result: 5.3 % (11-09-23 @ 07:00)    Glucose:   BP: 140/77 (11-13-23 @ 09:30) (129/68 - 169/99)  Lipid Panel: Date/Time: 11-09-23 @ 04:30  Cholesterol, Serum: 187  Direct LDL: --  HDL Cholesterol, Serum: 29  Total Cholesterol/HDL Ration Measurement: --  Triglycerides, Serum: 273

## 2023-11-14 NOTE — BH INPATIENT PSYCHIATRY PROGRESS NOTE - NSBHFUPINTERVALHXFT_PSY_A_CORE
Patient was seen and evaluated this morning. Nursing reports no acute events occurred overnight and over the weekend. No PRN medications were administered overnight. Chart was reviewed.    Upon approach, patient walking in the hallway. Patient is interviewed in private. Patient is alert, oriented, and engages with interviewer. Patient is cooperative and answers all questions appropriately. Patient states that he is "feeling good" and that he is feeling less anxious. Patient states that he likes the current medication dosage and feels like it is working well for him. Patient denies any current passive or active suicidal ideation, intent or plan and denies any homicidal ideation. Patient denies any persecutory delusions and denies any auditory or visual hallucinations.

## 2023-11-14 NOTE — BH INPATIENT PSYCHIATRY PROGRESS NOTE - NSBHATTESTCOMMENTATTENDFT_PSY_A_CORE
unable to assess
Patient was seen, evaluated, and discussed with the Resident, Dr Wade. Chart reviewed.   Is compliant with medications, denied side effects.   Agree with assessment and plan above. Continue with medications as above.

## 2023-11-14 NOTE — BH INPATIENT PSYCHIATRY PROGRESS NOTE - NSBHFUPINTERVALCCFT_PSY_A_CORE
"I still anxious"  "I do not want to stop but I am changing to using medicine" "they have been increasing it".      
"I'm anxious"
" feeling less anxious  ".      
"I'm less anxious"
"I'm less anxious"

## 2023-11-14 NOTE — BH INPATIENT PSYCHIATRY PROGRESS NOTE - NSBHATTESTBILLING_PSY_A_CORE
76344-Nzblbnoxha OBS or IP - low complexity OR 25-34 mins
12157-Ewhhpnhnbr OBS or IP - low complexity OR 25-34 mins
04623-Zmcddymonh OBS or IP - low complexity OR 25-34 mins
[id number]-Fxnpeieeba OBS or IP - low complexity OR 25-34 mins
38294-Assngbpwgm OBS or IP - low complexity OR 25-34 mins

## 2023-11-14 NOTE — BH INPATIENT PSYCHIATRY PROGRESS NOTE - NSBHCHARTREVIEWLAB_PSY_A_CORE FT
Testosterone, Free and Total (11.10.23 @ 08:30)   Testosterone, Free: 14.4 pg/mL  Testosterone, Total Serum: 551.0: Male Testosterone Reference Range   Roberto Stage 1 ---------- <2.5 ng/dL   Roberto Stage 2 ---------- <2.50 - 432 ng/dL   Roberto Stage 3 ---------- 64.9 - 778 ng/dL   Roberto Stage 4 ---------- 180 - 763 ng/dL   Roberto Stage 5 ---------- 188 - 882 ng/dL   18- 49 years ---------- 300 - 836 ng/dL   >/= 50 years ---------- 300 - 740 ng/dL   Female Testosterone Reference Range   Roberto Stage 1 ---------- <2.5 - 6.12 ng/dL   Roberto Stage 2 ---------- <2.50 - 10.4 ng/dL   Roberto Stage 3 ---------- <2.50 - 23.7 ng/dL   Roberto Stage 4 ---------- <2.50 - 26.8 ng/dL   Roberto Stage 5 ---------- 4.60 - 38.3 ng/dL   18 - 49 years ---------- 8.4 - 48.1 ng/dL   >/= 50 years ---------- 2.9 - 40.8 ng/dL   Interpret testosterone concentrations less than 12.0 ng/dL with caution   as the coefficient of variation in the low range is greater than 20%. ng/dL    Comprehensive Metabolic Panel in AM (11.10.23 @ 08:13)   Sodium: 139 mmol/L  Potassium: 3.8 mmol/L  Chloride: 100 mmol/L  Carbon Dioxide: 29 mmol/L  Anion Gap: 10 mmol/L  Blood Urea Nitrogen: 19 mg/dL  Creatinine: 1.0 mg/dL  Glucose: 121 mg/dL  Calcium: 9.3 mg/dL  Protein Total: 6.7 g/dL  Albumin: 4.2 g/dL  Bilirubin Total: 0.2 mg/dL  Alkaline Phosphatase: 75 U/L  Aspartate Aminotransferase (AST/SGOT): 25 U/L  Alanine Aminotransferase (ALT/SGPT): 37 U/L  eGFR: 101

## 2023-11-14 NOTE — BH INPATIENT PSYCHIATRY PROGRESS NOTE - NSBHASSESSSUMMFT_PSY_ALL_CORE
Mr. Cannon is a  32 yo male, currently domiciled at home with family, currently employed, denies violence/legal history, uses cannabis, UA + for cocaine, with PMHx of low Testerone secondary to remote anabolic steroid use, PPHx of reported depression/anxiety, no inpatient hospitalizations (however seen for SI in ED on 08/01/23), no suicide attempts, attends marriage counseling, sees private psychiatrist Dr. Beck whos prescribes Alprazolam and Lexapro, was brought in by EMS accompanied by his friend to the ED after being found somnolent in a motel room and surrounded by an empty bottle of Nyquil, cyclobenzaprine tablets, Naprosyn tablets and alprazolam tablets.    Upon assessment the patient appears to be minimizing and guarded surrounding the details of his presentation and week leading up to his admission. He appears to be depressed and anxious, with evidence of bilateral wrist lacerations requiring sutures. He remains organized, oriented, and amenable to discussion. Prior collateral from ED obtained from friend and wife is concerning for recent erratic behavior, mood swings, disappearances, suicidal statements, and being found lethargic surrounded by empty medication bottles. At this time collateral believe that the patient took a bottle of Nyquil in a suicide attempt, and is not safe to be discharged. The patient's behavior is highly concerning for a suicidal attempt, and requires inpatient admission for stabilization and further evaluation. Patient will benefit from continued inpatient psychiatric hospitalization for further medication management and stabilization of symptoms. Patient will be started on venlafaxine 37.5 mg to target patient's persistent anxiety and recent exacerbation of depressive symptoms. Patient denies benefit of home Lexapro 20mg, where he requires Xanax daily for persistent anxiety, so this will be continued. In attempt to bridge to a benzodiazepine with a longer half life, patient's home Xanax will be discontinued and switched to ativan 2mg BID.     Upon today's assessment, patient appears less anxious. Venlafaxine will be continued at75 mg daily, as patient still experiencing low mood. Since patient just received first dose of Venlafaxine this morning, patient will be monitored for another day for any side effects and to assess benefit. Ativan will be continued at 3mg BID, due to this dose controlling patient's anxiety. Patient denies any current passive or active suicidal ideation, intent or plan and denies any homicidal ideation. Patient denies any persecutory delusions and denies any auditory or visual hallucinations. Patient will benefit from continued inpatient psychiatric hospitalization for further medication management and stabilization of symptoms.       #MDD  #TRISTON with history of panic disorder  - Continue venlafaxine 75mg daily for patient's low mood and anxiety   - Continue Ativan 3mg BID for anxiety  - Continue Trazadone 50mg at bedtime  - Discontinue Lexapro 20 mg at bedtime   - Discontinue Xanax 1mg TID PRN for anxiety  - Plan for discharge tomorrow as patient only received first dose of venlafaxine this morning     #Sutures in place left wrist  #Steri-strips on right wrist  - Keep dressing clean  - Sutures removed on 11/14    #History of Bronchitis  - Start 5 day course of Augmentin 875mg/125mg (day 5 of 5)    #Agitation  - For acute agitation not amenable to verbal redirection give haldol 5mg po q6h PRN, benadryl 50mg q6h PRN, ativan 1mg po q8h PRN with escalation to IM if patient is danger to self/other; if IM formulation used please order repeat EKG to ensure qtc <500ms

## 2023-11-14 NOTE — BH INPATIENT PSYCHIATRY DISCHARGE NOTE - HOSPITAL COURSE
Patient presented to the ED after being found at a motel with naproxen, nyquil, and a muscle relaxer. Collateral feared that patient was actively suicidal, but patient denies any current or past passive or active suicidal ideation, intent or plan and denies any homicidal ideation. Patient denies any persecutory delusions and denies any auditory or visual hallucinations. He does present with persistent anxiety and preoccupations about his benzodiazepines dose; he was switched from home Xanax 1mg TID PRN to Ativan 3mg BID standing, which appears to be controlling his symptoms. He denies any benefit from home lexapro, so was switched to venlafaxine to target his anxiety as well as his low mood. Due to patient's reported poor sleep, he was started on Trazadone 50mg QHS, with relief of symptoms. Patient is planned for discharge today. Patient is agreeable with outpatient follow up.

## 2023-11-14 NOTE — BH INPATIENT PSYCHIATRY DISCHARGE NOTE - OTHER PAST PSYCHIATRIC HISTORY (INCLUDE DETAILS REGARDING ONSET, COURSE OF ILLNESS, INPATIENT/OUTPATIENT TREATMENT)
Previous Dx: reported Anxiety and Depression,     Current medication: Alprazolam 1mg PRN (max 3mg day) - not found in I-STOP, lexapro 20mg daily  Previous Med Trials: Paxil, Klonopin  No history of suicide attempts, denies history of self harming  See a therapist "Carlos" with wife every other week, Dr. Claudia Beck ([phone]) prescribes Xanax  Previous IP treatment: denies   Violence/Legal: denies

## 2023-11-14 NOTE — BH INPATIENT PSYCHIATRY PROGRESS NOTE - GENERAL APPEARANCE
Other
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
## 2023-11-14 NOTE — BH INPATIENT PSYCHIATRY DISCHARGE NOTE - NSDCCPCAREPLAN_GEN_ALL_CORE_FT
PRINCIPAL DISCHARGE DIAGNOSIS  Diagnosis: Major depressive disorder  Assessment and Plan of Treatment:       SECONDARY DISCHARGE DIAGNOSES  Diagnosis: Generalized anxiety disorder  Assessment and Plan of Treatment:     Diagnosis: History of panic disorder  Assessment and Plan of Treatment:

## 2023-11-14 NOTE — BH INPATIENT PSYCHIATRY DISCHARGE NOTE - NSBHASSESSSUMMFT_PSY_ALL_CORE
Mr. Cannon is a  34 yo male, currently domiciled at home with family, currently employed, denies violence/legal history, uses cannabis, UA + for cocaine, with PMHx of low Testerone secondary to remote anabolic steroid use, PPHx of reported depression/anxiety, no inpatient hospitalizations (however seen for SI in ED on 08/01/23), no suicide attempts, attends marriage counseling, sees private psychiatrist Dr. Beck whos prescribes Alprazolam and Lexapro, was brought in by EMS accompanied by his friend to the ED after being found somnolent in a motel room and surrounded by an empty bottle of Nyquil, cyclobenzaprine tablets, Naprosyn tablets and alprazolam tablets.    Upon assessment the patient appears to be minimizing and guarded surrounding the details of his presentation and week leading up to his admission. He appears to be depressed and anxious, with evidence of bilateral wrist lacerations requiring sutures. He remains organized, oriented, and amenable to discussion. Prior collateral from ED obtained from friend and wife is concerning for recent erratic behavior, mood swings, disappearances, suicidal statements, and being found lethargic surrounded by empty medication bottles. At this time collateral believe that the patient took a bottle of Nyquil in a suicide attempt, and is not safe to be discharged. The patient's behavior is highly concerning for a suicidal attempt, and requires inpatient admission for stabilization and further evaluation. Patient will benefit from continued inpatient psychiatric hospitalization for further medication management and stabilization of symptoms. Patient will be started on venlafaxine 37.5 mg to target patient's persistent anxiety and recent exacerbation of depressive symptoms. Patient denies benefit of home Lexapro 20mg, where he requires Xanax daily for persistent anxiety, so this will be continued. In attempt to bridge to a benzodiazepine with a longer half life, patient's home Xanax will be discontinued and switched to ativan 2mg BID.     Upon today's assessment, patient appears less anxious. Venlafaxine will be continued at 75 mg daily. Ativan will be continued at 3mg BID, due to this dose controlling patient's anxiety. Patient was seen, evaluated, chart reviewed.  As per nursing staff, no events overnight. On evaluation, patient reports that they are doing well. Offered no new complaints. Patient is compliant with medications, denies negative side effects. Endorsed good sleep and appetite. Denies auditory or visual hallucinations. Denies paranoia. Denies suicidal and homicidal ideation, intent, or plan. Patient is planned for discharge today. Patient is agreeable with outpatient follow up.    #MDD  #TRISTON with history of panic disorder  - Continue venlafaxine 75mg daily for patient's low mood and anxiety   - Continue Ativan 3mg BID for anxiety  - Continue Trazadone 50mg at bedtime  - Discontinue Lexapro 20 mg at bedtime   - Discontinue Xanax 1mg TID PRN for anxiety  - Plan for discharge today    #Sutures in place left wrist  #Steri-strips on right wrist  - Keep dressing clean  - Sutures removed on 11/14    #History of Bronchitis  - Start 5 day course of Augmentin 875mg/125mg (day 5 of 5)    #Agitation  - For acute agitation not amenable to verbal redirection give haldol 5mg po q6h PRN, benadryl 50mg q6h PRN, ativan 1mg po q8h PRN with escalation to IM if patient is danger to self/other; if IM formulation used please order repeat EKG to ensure qtc <500ms

## 2023-11-14 NOTE — BH INPATIENT PSYCHIATRY DISCHARGE NOTE - NSDCMRMEDTOKEN_GEN_ALL_CORE_FT
finasteride 1 mg oral tablet: 1 tab(s) orally once a day  LORazepam 1 mg oral tablet: 3 tab(s) orally 2 times a day MDD: 6mg  testosterone cypionate 200 mg/mL intramuscular solution: 200 milligram(s) intramuscularly 2 times a week Patient purchases this online and self-administers it. Brand is [email or web address] Testosterone Cypionate.  traZODone 50 mg oral tablet: 1 tab(s) orally once a day (at bedtime) x 30 days  venlafaxine 75 mg oral capsule, extended release: 1 cap(s) orally once a day x 30 days   finasteride 1 mg oral tablet: 1 tab(s) orally once a day  LORazepam 1 mg oral tablet: 3 tab(s) orally 2 times a day x 30 days MDD: 6mg  testosterone cypionate 200 mg/mL intramuscular solution: 200 milligram(s) intramuscularly 2 times a week Patient purchases this online and self-administers it. Brand is [employer] Testosterone Cypionate.  traZODone 50 mg oral tablet: 1 tab(s) orally once a day (at bedtime) x 30 days  venlafaxine 75 mg oral capsule, extended release: 1 cap(s) orally once a day x 30 days

## 2023-11-14 NOTE — BH INPATIENT PSYCHIATRY PROGRESS NOTE - NSBHCHARTREVIEWVS_PSY_A_CORE FT
Vital Signs Last 24 Hrs  T(C): 35.3 (11-10-23 @ 08:40), Max: 35.6 (11-09-23 @ 17:20)  T(F): 95.5 (11-10-23 @ 08:40), Max: 96.1 (11-09-23 @ 17:20)  HR: 92 (11-10-23 @ 08:40) (92 - 116)  BP: 182/82 (11-10-23 @ 08:40) (131/80 - 182/82)  BP(mean): --  RR: 18 (11-10-23 @ 08:40) (18 - 18)  SpO2: --    
Vital Signs Last 24 Hrs  T(C): 35.8 (11-13-23 @ 09:30), Max: 35.8 (11-13-23 @ 09:30)  T(F): 96.4 (11-13-23 @ 09:30), Max: 96.4 (11-13-23 @ 09:30)  HR: 110 (11-13-23 @ 09:30) (101 - 110)  BP: 140/77 (11-13-23 @ 09:30) (129/68 - 140/77)  BP(mean): --  RR: 18 (11-13-23 @ 09:30) (17 - 18)  SpO2: --    
Vital Signs Last 24 Hrs  T(C): 36.1 (11-12-23 @ 08:45), Max: 36.4 (11-11-23 @ 15:53)  T(F): 96.9 (11-12-23 @ 08:45), Max: 97.5 (11-11-23 @ 15:53)  HR: 101 (11-12-23 @ 08:45) (64 - 101)  BP: 139/87 (11-12-23 @ 08:45) (139/87 - 169/99)  BP(mean): --  RR: 18 (11-12-23 @ 08:45) (16 - 18)  SpO2: --    
Vital Signs Last 24 Hrs  T(C): 35.3 (11-14-23 @ 08:35), Max: 35.5 (11-13-23 @ 16:30)  T(F): 95.6 (11-14-23 @ 08:35), Max: 95.9 (11-13-23 @ 16:30)  HR: 95 (11-14-23 @ 08:35) (92 - 95)  BP: 169/79 (11-14-23 @ 08:35) (169/79 - 174/86)  BP(mean): --  RR: 18 (11-14-23 @ 08:35) (16 - 18)  SpO2: --    
Vital Signs Last 24 Hrs  T(C): 35.6 (11-10-23 @ 17:02), Max: 35.6 (11-10-23 @ 17:02)  T(F): 96 (11-10-23 @ 17:02), Max: 96 (11-10-23 @ 17:02)  HR: 98 (11-10-23 @ 17:02) (98 - 98)  BP: 144/82 (11-10-23 @ 17:02) (144/82 - 144/82)  BP(mean): --  RR: 16 (11-10-23 @ 17:02) (16 - 16)  SpO2: --

## 2023-11-14 NOTE — BH INPATIENT PSYCHIATRY PROGRESS NOTE - NSBHMSETHTCONTENT_PSY_A_CORE
Unremarkable/Suicidality
Unremarkable/Suicidality
Unremarkable
Unremarkable/Suicidality
Unremarkable

## 2023-11-14 NOTE — BH INPATIENT PSYCHIATRY PROGRESS NOTE - NSTXDEPRESGOAL_PSY_ALL_CORE
01-Mar-2019
Attend and participate in at least 2 groups daily despite low mood/energy

## 2023-11-14 NOTE — BH INPATIENT PSYCHIATRY DISCHARGE NOTE - REASON FOR ADMISSION
You were admitted to the inpatient unit because you were found with multiple medications, and appeared drowsy.  You were admitted to the inpatient unit because of depression and anxiety

## 2023-11-14 NOTE — BH INPATIENT PSYCHIATRY DISCHARGE NOTE - NSBHMETABOLIC_PSY_ALL_CORE_FT
BMI: BMI (kg/m2): 32.3 (11-08-23 @ 19:03)  HbA1c: A1C with Estimated Average Glucose Result: 5.3 % (11-09-23 @ 07:00)    Glucose:   BP: 169/79 (11-14-23 @ 08:35) (129/68 - 174/86)  Lipid Panel: Date/Time: 11-09-23 @ 04:30  Cholesterol, Serum: 187  Direct LDL: --  HDL Cholesterol, Serum: 29  Total Cholesterol/HDL Ration Measurement: --  Triglycerides, Serum: 273

## 2023-11-14 NOTE — BH INPATIENT PSYCHIATRY DISCHARGE NOTE - ATTENDING DISCHARGE PHYSICAL EXAMINATION:
Patient was seen, evaluated, chart reviewed.  As per nursing staff, no events overnight. On evaluation, patient reports that they are doing well. Offered no new complaints. Patient is compliant with medications, denies negative side effects. Endorsed good sleep and appetite. Denies auditory or visual hallucinations. Denies paranoia. Denies suicidal and homicidal ideation, intent, or plan. Patient is planned for discharge today. Patient is agreeable with outpatient follow up.  Suicide scale is 0, AIMS is 0, CGI is markedly improved throughout.

## 2023-11-14 NOTE — CHART NOTE - NSCHARTNOTEFT_GEN_A_CORE
Called by Primary Team for removal of sutures.     Left Wrist: Wound Clean and dry, no erythema or edema 5 simple interrupted sutures in place  Right Wrist: No erythema or edema. Steri-strips in place.    Sutures removed from left wrist and placed steri-strips.  Replaced steri-strips on right wrist.

## 2023-11-14 NOTE — BH DISCHARGE NOTE NURSING/SOCIAL WORK/PSYCH REHAB - NSDCVIVACCINE_GEN_ALL_CORE_FT
Tdap; 26-Sep-2022 09:20; Celine Lamb (RN); [employer]; [id number] (Exp. Date: 04-Nov-2024); IntraMuscular; Vastus Lateralis Right.; 0.5 milliLiter(s); VIS (VIS Published: 09-May-2013, VIS Presented: 26-Sep-2022);
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
## 2023-11-14 NOTE — BH DISCHARGE NOTE NURSING/SOCIAL WORK/PSYCH REHAB - PATIENT PORTAL LINK FT
You can access the FollowMyHealth Patient Portal offered by [facility] by registering at the following website: [email or web address]. By joining [employer]’s FollowMyHealth portal, you will also be able to view your health information using other applications (apps) compatible with our system.

## 2023-11-14 NOTE — BH INPATIENT PSYCHIATRY DISCHARGE NOTE - NSBHDCMEDICALFT_PSY_A_CORE
Left Wrist: Wound Clean and dry, no erythema or edema 5 simple interrupted sutures in place  Right Wrist: No erythema or edema. Steri-strips in place.    Sutures removed from left wrist and placed steri-strips.  Replaced steri-strips on right wrist.

## 2023-11-14 NOTE — BH INPATIENT PSYCHIATRY DISCHARGE NOTE - NSBHFUPINTERVALHXFT_PSY_A_CORE
Patient was seen, evaluated, chart reviewed.  As per nursing staff, no events overnight. On evaluation, patient reports that they are doing well. Offered no new complaints. Patient is compliant with medications, denies negative side effects. Endorsed good sleep and appetite. Denies auditory or visual hallucinations. Denies paranoia. Denies suicidal and homicidal ideation, intent, or plan. Patient is planned for discharge today. Patient is agreeable with outpatient follow up.   Patient was seen, evaluated, chart reviewed.  As per nursing staff, no events overnight. On evaluation, patient reports that they are doing well. Offered no new complaints. Patient is compliant with medications, denies negative side effects. Endorsed good sleep and appetite. Denies auditory or visual hallucinations. Denies paranoia. Denies suicidal and homicidal ideation, intent, or plan. Patient is planned for discharge today. Patient is agreeable with outpatient follow up.  Suicide scale is 0, AIMS is 0, CGI is markedly improved throughout.

## 2023-11-14 NOTE — BH INPATIENT PSYCHIATRY PROGRESS NOTE - CURRENT MEDICATION
MEDICATIONS  (STANDING):  LORazepam     Tablet 3 milliGRAM(s) Oral two times a day  traZODone 50 milliGRAM(s) Oral at bedtime  venlafaxine XR 75 milliGRAM(s) Oral daily    MEDICATIONS  (PRN):  acetaminophen     Tablet .. 650 milliGRAM(s) Oral every 6 hours PRN Moderate Pain (4 - 6)  aluminum hydroxide/magnesium hydroxide/simethicone Suspension 30 milliLiter(s) Oral every 6 hours PRN Dyspepsia  bacitracin   Ointment 1 Application(s) Topical two times a day PRN wound  diphenhydrAMINE 50 milliGRAM(s) Oral every 6 hours PRN Extrapyramidal prophylaxis  haloperidol     Tablet 5 milliGRAM(s) Oral every 6 hours PRN agitation  hydrOXYzine hydrochloride 50 milliGRAM(s) Oral every 6 hours PRN anxiety  LORazepam     Tablet 1 milliGRAM(s) Oral every 8 hours PRN severe anxiety  melatonin. 5 milliGRAM(s) Oral at bedtime PRN Insomnia  vitamin A &amp; D Ointment 1 Application(s) Topical three times a day PRN dry skin

## 2023-11-14 NOTE — BH INPATIENT PSYCHIATRY PROGRESS NOTE - NSBHMSEBEHAV_PSY_A_CORE
Crying often/Cooperative

## 2023-11-14 NOTE — BH INPATIENT PSYCHIATRY DISCHARGE NOTE - NSBHMSEHYG_PSY_A_CORE
"4/22/2019       RE: Stanford Andersen  [address]  [facility] [address]     Dear Colleague,    Thank you for referring your patient, Stanford Andersen, to the [facility] [place] at [facility]. Please see a copy of my visit note below.    CC: Flank pain.    HPI: It is a pleasure to see Stanford Andersen, a pleasant 71 year old year old seen today in the urology clinic in consultation from Arleen Caicedo PA-C for evaluation of the above.  The flank pain has been intermittent since 2 weeks ago.  Nocturia 1 time per night (down from 3).     Denies gross hematuria, dysuria, fevers, chills, N/V. Long history of kidney stones.  In the 1980s and 1990s had multiple stone procedures. Was told he had \"sponge\" kidney on the right.    Started on Flomax/finasteride in Jan for urinary urgency and leakage and nocturia. Renal US 4/18/19 showed bilateral hydro. UA neg.     PSA 10/8/18 2.03.    Hx of Zollinger-Patricio syndrome several years ago.     RECENT IMAGING:  RENAL ULTRASOUND   4/18/2019 4:17 PM      HISTORY: Flank pain     COMPARISON: None.     FINDINGS: There is moderate bilateral hydronephrosis. There are  echogenic foci in the left kidney consistent with  stones.                                                                    IMPRESSION:  1. Moderate bilateral hydronephrosis.  2. Stones in the left kidney.    Past Medical History:   Diagnosis Date     Chronic back pain      Complication of anesthesia     URINARY RETENTION     Gastro-oesophageal reflux disease      Hypertension      Renal disease     kidney stone     Zollinger-Patricio syndrome        Past Surgical History:   Procedure Laterality Date     ABDOMEN SURGERY  2003    Exploratory laparoscopy     HC EXCISE VARICOCELE  age 18     varicocele repair     HERNIORRHAPHY UMBILICAL N/A 11/30/2018    Procedure: OPEN UMBILLICAL HERNIA REPAIR ;  Surgeon: Ike Catalan MD;  Location: " "[facility]     KIDNEY SURGERY      lithotripsy x 3     LAPAROSCOPIC HERNIORRHAPHY INGUINAL Left 9/26/2016    Procedure: LAPAROSCOPIC HERNIORRHAPHY INGUINAL;  Surgeon: Ike Catalan MD;  Location:  [facility]     LITHOTRIPSY      three times     STOMACH SURGERY  2003    exploratory scoping looking for tumors related to ZE syndrome          Allergies   Allergen Reactions     Dye [Contrast Dye] Anaphylaxis and Hives     Penicillins Anaphylaxis     Swelling, arm turned blue     Tramadol Itching     Oxycodone-Acetaminophen Rash       Cannot display prior to admission medications because the patient has not been admitted in this contact.       FAMILY HISTORY: There is no family h/o  malignancy.  There is no family h/o urolithiasis.     SOCIAL HISTORY: The patient does not smoke cigarettes, Denies EtOH and illicit drug use.     ROS: A comprehensive 14 point ROS was obtained and otherwise negative except for that outlined above in the HPI.    GENERAL PHYSICAL EXAM:   Ht 1.727 m (5' 8\")   Wt 75.8 kg (167 lb)   BMI 25.39 kg/m     GEN: NAD  EYES: EOMI  MOUTH: MMM  NECK: Supple  RESP: Unlabored breathing  CARDIAC: No LE edema  SKIN: Warm  ABD: soft  NEURO: AAO      UA neg.      ASSESSMENT AND PLAN: Stanford Andersen is a very pleasant 71 year old year old with flank pain and stone disease hx, bilateral hydro.  -CT w/o to evaluate the pain, will call pt with results and recommended f/u. Discussed ESWL, PCNL, URS and follow up of bilateral hydro.     - Warning signs discussed including fevers, chills, N/V, gross hematuria, severe pain that is refractory to medications which should prompt more urgent evaluation. Patient understands to proceed to the ER should these warning signs occur outside of clinic hours.    I have enjoyed participating in the medical care of this very pleasant patient and will continue to keep you updated on her progress.  Please don't hesitate to contact me with any questions or concerns.      Wanda Owens, " FLAVIA  [facility]    30 minutes were spent with the patient today, > 50% in counseling and coordination of care for flank pain.      Again, thank you for allowing me to participate in the care of your patient.      Sincerely,    Wanda Owens PA-C, FLAVIA       Good
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
## 2023-11-14 NOTE — BH INPATIENT PSYCHIATRY PROGRESS NOTE - PRN MEDS
MEDICATIONS  (PRN):  acetaminophen     Tablet .. 650 milliGRAM(s) Oral every 6 hours PRN Moderate Pain (4 - 6)  aluminum hydroxide/magnesium hydroxide/simethicone Suspension 30 milliLiter(s) Oral every 6 hours PRN Dyspepsia  bacitracin   Ointment 1 Application(s) Topical two times a day PRN wound  diphenhydrAMINE 50 milliGRAM(s) Oral every 6 hours PRN Extrapyramidal prophylaxis  haloperidol     Tablet 5 milliGRAM(s) Oral every 6 hours PRN agitation  hydrOXYzine hydrochloride 50 milliGRAM(s) Oral every 6 hours PRN anxiety  LORazepam     Tablet 1 milliGRAM(s) Oral every 8 hours PRN severe anxiety  melatonin. 5 milliGRAM(s) Oral at bedtime PRN Insomnia  vitamin A &amp; D Ointment 1 Application(s) Topical three times a day PRN dry skin  

## 2023-11-14 NOTE — BH DISCHARGE NOTE NURSING/SOCIAL WORK/PSYCH REHAB - NSCDUDCCRISIS_PSY_A_CORE
[facility]  1 [phone] [facility] ([phone])  Text "WELL" to [phone]  Website: www.[email or web address]/.[employer] 1 (864) 621-HOPE (3435) Website: www.safehorizon.org/.National Suicide Prevention Lifeline 5 (281) 177-1518/.  [employer]  1 (039) LIFENET (392-3585)/988 Suicide and Crisis Lifeline

## 2023-11-14 NOTE — BH CHART NOTE - RISK ASSESSMENT
Warning signs: isolation, anxiety  Static risk factors: male gender,  race  Modifiable risk factors: psychiatric illness, recent psychosocial stressors, medical illness, substance use/intoxication, unemployment.  Protective factors: social support, family connectiveness, parenthood  Access to lethal: denies  Level of risk: low

## 2023-11-14 NOTE — BH INPATIENT PSYCHIATRY PROGRESS NOTE - NSDCCRITERIA_PSY_ALL_CORE
Less guarded, ability to safety plan, able to commit to safety 

## 2023-11-14 NOTE — BH INPATIENT PSYCHIATRY PROGRESS NOTE - NSTXANXGOAL_PSY_ALL_CORE
Identify and practice 3 coping skills to manage anxiety

## 2023-11-15 VITALS
HEART RATE: 80 BPM | DIASTOLIC BLOOD PRESSURE: 70 MMHG | TEMPERATURE: 96 F | RESPIRATION RATE: 18 BRPM | SYSTOLIC BLOOD PRESSURE: 130 MMHG

## 2023-11-15 PROCEDURE — 99238 HOSP IP/OBS DSCHRG MGMT 30/<: CPT

## 2023-11-15 RX ADMIN — Medication 75 MILLIGRAM(S): at 08:23

## 2023-11-15 RX ADMIN — Medication 3 MILLIGRAM(S): at 08:23

## 2023-11-17 DIAGNOSIS — J40 BRONCHITIS, NOT SPECIFIED AS ACUTE OR CHRONIC: ICD-10-CM

## 2023-11-17 DIAGNOSIS — Z11.52 ENCOUNTER FOR SCREENING FOR COVID-19: ICD-10-CM

## 2023-11-17 DIAGNOSIS — Z81.8 FAMILY HISTORY OF OTHER MENTAL AND BEHAVIORAL DISORDERS: ICD-10-CM

## 2023-11-17 DIAGNOSIS — F41.1 GENERALIZED ANXIETY DISORDER: ICD-10-CM

## 2023-11-17 DIAGNOSIS — R45.851 SUICIDAL IDEATIONS: ICD-10-CM

## 2023-11-17 DIAGNOSIS — F32.9 MAJOR DEPRESSIVE DISORDER, SINGLE EPISODE, UNSPECIFIED: ICD-10-CM

## 2023-11-17 DIAGNOSIS — T39.1X2A POISONING BY 4-AMINOPHENOL DERIVATIVES, INTENTIONAL SELF-HARM, INITIAL ENCOUNTER: ICD-10-CM

## 2023-11-17 NOTE — BH SOCIAL WORK CONFIRMATION FOLLOW UP NOTE - NSLINKEDTOLOC_PSY_ALL_CORE
Doroteo did not attend his outpatient mental health appointment with Dr. Claudia Beck on 11/15, [phone]. According to his wife, patient checked himself into [facility] in [place] for treatment.

## 2024-02-25 ENCOUNTER — EMERGENCY (EMERGENCY)
Facility: HOSPITAL | Age: 34
LOS: 0 days | Discharge: HOME | End: 2024-02-25
Attending: EMERGENCY MEDICINE
Payer: SELF-PAY

## 2024-02-25 VITALS — HEART RATE: 125 BPM | SYSTOLIC BLOOD PRESSURE: 212 MMHG | DIASTOLIC BLOOD PRESSURE: 180 MMHG | RESPIRATION RATE: 20 BRPM

## 2024-02-25 DIAGNOSIS — F14.90 COCAINE USE, UNSPECIFIED, UNCOMPLICATED: ICD-10-CM

## 2024-02-25 DIAGNOSIS — F41.9 ANXIETY DISORDER, UNSPECIFIED: ICD-10-CM

## 2024-02-25 DIAGNOSIS — F13.20 SEDATIVE, HYPNOTIC OR ANXIOLYTIC DEPENDENCE, UNCOMPLICATED: ICD-10-CM

## 2024-02-25 DIAGNOSIS — D17.1 BENIGN LIPOMATOUS NEOPLASM OF SKIN AND SUBCUTANEOUS TISSUE OF TRUNK: Chronic | ICD-10-CM

## 2024-02-25 DIAGNOSIS — F32.9 MAJOR DEPRESSIVE DISORDER, SINGLE EPISODE, UNSPECIFIED: ICD-10-CM

## 2024-02-25 DIAGNOSIS — F39 UNSPECIFIED MOOD [AFFECTIVE] DISORDER: ICD-10-CM

## 2024-02-25 DIAGNOSIS — Z92.241 PERSONAL HISTORY OF SYSTEMIC STEROID THERAPY: ICD-10-CM

## 2024-02-25 DIAGNOSIS — R86.1 ABNORMAL LEVEL OF HORMONES IN SPECIMENS FROM MALE GENITAL ORGANS: ICD-10-CM

## 2024-02-25 PROBLEM — R79.89 OTHER SPECIFIED ABNORMAL FINDINGS OF BLOOD CHEMISTRY: Chronic | Status: ACTIVE | Noted: 2023-11-09

## 2024-02-25 LAB
ALBUMIN SERPL ELPH-MCNC: 4.6 G/DL — SIGNIFICANT CHANGE UP (ref 3.5–5.2)
ALP SERPL-CCNC: 82 U/L — SIGNIFICANT CHANGE UP (ref 30–115)
ALT FLD-CCNC: 28 U/L — SIGNIFICANT CHANGE UP (ref 0–41)
ANION GAP SERPL CALC-SCNC: 10 MMOL/L — SIGNIFICANT CHANGE UP (ref 7–14)
APAP SERPL-MCNC: <5 UG/ML — LOW (ref 10–30)
APPEARANCE UR: CLEAR — SIGNIFICANT CHANGE UP
AST SERPL-CCNC: 30 U/L — SIGNIFICANT CHANGE UP (ref 0–41)
BASOPHILS # BLD AUTO: 0.03 K/UL — SIGNIFICANT CHANGE UP (ref 0–0.2)
BASOPHILS NFR BLD AUTO: 0.3 % — SIGNIFICANT CHANGE UP (ref 0–1)
BILIRUB DIRECT SERPL-MCNC: <0.2 MG/DL — SIGNIFICANT CHANGE UP (ref 0–0.3)
BILIRUB INDIRECT FLD-MCNC: >0.1 MG/DL — LOW (ref 0.2–1.2)
BILIRUB SERPL-MCNC: 0.3 MG/DL — SIGNIFICANT CHANGE UP (ref 0.2–1.2)
BILIRUB UR-MCNC: NEGATIVE — SIGNIFICANT CHANGE UP
BUN SERPL-MCNC: 21 MG/DL — HIGH (ref 10–20)
CALCIUM SERPL-MCNC: 9.7 MG/DL — SIGNIFICANT CHANGE UP (ref 8.4–10.5)
CHLORIDE SERPL-SCNC: 98 MMOL/L — SIGNIFICANT CHANGE UP (ref 98–110)
CHOLEST SERPL-MCNC: 235 MG/DL — HIGH
CO2 SERPL-SCNC: 28 MMOL/L — SIGNIFICANT CHANGE UP (ref 17–32)
COLOR SPEC: YELLOW — SIGNIFICANT CHANGE UP
CREAT SERPL-MCNC: 1.1 MG/DL — SIGNIFICANT CHANGE UP (ref 0.7–1.5)
DIFF PNL FLD: NEGATIVE — SIGNIFICANT CHANGE UP
EGFR: 90 ML/MIN/1.73M2 — SIGNIFICANT CHANGE UP
EOSINOPHIL # BLD AUTO: 0.01 K/UL — SIGNIFICANT CHANGE UP (ref 0–0.7)
EOSINOPHIL NFR BLD AUTO: 0.1 % — SIGNIFICANT CHANGE UP (ref 0–8)
ETHANOL SERPL-MCNC: <10 MG/DL — SIGNIFICANT CHANGE UP
FLUAV AG NPH QL: SIGNIFICANT CHANGE UP
FLUBV AG NPH QL: SIGNIFICANT CHANGE UP
GLUCOSE SERPL-MCNC: 95 MG/DL — SIGNIFICANT CHANGE UP (ref 70–99)
GLUCOSE UR QL: NEGATIVE MG/DL — SIGNIFICANT CHANGE UP
HCT VFR BLD CALC: 49.8 % — SIGNIFICANT CHANGE UP (ref 42–52)
HDLC SERPL-MCNC: 32 MG/DL — LOW
HGB BLD-MCNC: 17.1 G/DL — SIGNIFICANT CHANGE UP (ref 14–18)
IMM GRANULOCYTES NFR BLD AUTO: 0.3 % — SIGNIFICANT CHANGE UP (ref 0.1–0.3)
KETONES UR-MCNC: NEGATIVE MG/DL — SIGNIFICANT CHANGE UP
LEUKOCYTE ESTERASE UR-ACNC: NEGATIVE — SIGNIFICANT CHANGE UP
LIPID PNL WITH DIRECT LDL SERPL: 150 MG/DL — HIGH
LYMPHOCYTES # BLD AUTO: 0.97 K/UL — LOW (ref 1.2–3.4)
LYMPHOCYTES # BLD AUTO: 10.1 % — LOW (ref 20.5–51.1)
MCHC RBC-ENTMCNC: 30.1 PG — SIGNIFICANT CHANGE UP (ref 27–31)
MCHC RBC-ENTMCNC: 34.3 G/DL — SIGNIFICANT CHANGE UP (ref 32–37)
MCV RBC AUTO: 87.5 FL — SIGNIFICANT CHANGE UP (ref 80–94)
MONOCYTES # BLD AUTO: 0.87 K/UL — HIGH (ref 0.1–0.6)
MONOCYTES NFR BLD AUTO: 9 % — SIGNIFICANT CHANGE UP (ref 1.7–9.3)
NEUTROPHILS # BLD AUTO: 7.72 K/UL — HIGH (ref 1.4–6.5)
NEUTROPHILS NFR BLD AUTO: 80.2 % — HIGH (ref 42.2–75.2)
NITRITE UR-MCNC: NEGATIVE — SIGNIFICANT CHANGE UP
NON HDL CHOLESTEROL: 203 MG/DL — HIGH
NRBC # BLD: 0 /100 WBCS — SIGNIFICANT CHANGE UP (ref 0–0)
PH UR: 6.5 — SIGNIFICANT CHANGE UP (ref 5–8)
PLATELET # BLD AUTO: 213 K/UL — SIGNIFICANT CHANGE UP (ref 130–400)
PMV BLD: 10.4 FL — SIGNIFICANT CHANGE UP (ref 7.4–10.4)
POTASSIUM SERPL-MCNC: 4.1 MMOL/L — SIGNIFICANT CHANGE UP (ref 3.5–5)
POTASSIUM SERPL-SCNC: 4.1 MMOL/L — SIGNIFICANT CHANGE UP (ref 3.5–5)
PROT SERPL-MCNC: 7.7 G/DL — SIGNIFICANT CHANGE UP (ref 6–8)
PROT UR-MCNC: NEGATIVE MG/DL — SIGNIFICANT CHANGE UP
RBC # BLD: 5.69 M/UL — SIGNIFICANT CHANGE UP (ref 4.7–6.1)
RBC # FLD: 12 % — SIGNIFICANT CHANGE UP (ref 11.5–14.5)
RSV RNA NPH QL NAA+NON-PROBE: SIGNIFICANT CHANGE UP
SALICYLATES SERPL-MCNC: <0.3 MG/DL — LOW (ref 4–30)
SARS-COV-2 RNA SPEC QL NAA+PROBE: SIGNIFICANT CHANGE UP
SODIUM SERPL-SCNC: 136 MMOL/L — SIGNIFICANT CHANGE UP (ref 135–146)
SP GR SPEC: 1.01 — SIGNIFICANT CHANGE UP (ref 1–1.03)
TRIGL SERPL-MCNC: 265 MG/DL — HIGH
UROBILINOGEN FLD QL: 0.2 MG/DL — SIGNIFICANT CHANGE UP (ref 0.2–1)
WBC # BLD: 9.63 K/UL — SIGNIFICANT CHANGE UP (ref 4.8–10.8)
WBC # FLD AUTO: 9.63 K/UL — SIGNIFICANT CHANGE UP (ref 4.8–10.8)

## 2024-02-25 PROCEDURE — 81003 URINALYSIS AUTO W/O SCOPE: CPT

## 2024-02-25 PROCEDURE — 80061 LIPID PANEL: CPT

## 2024-02-25 PROCEDURE — 99284 EMERGENCY DEPT VISIT MOD MDM: CPT | Mod: 25

## 2024-02-25 PROCEDURE — 80076 HEPATIC FUNCTION PANEL: CPT

## 2024-02-25 PROCEDURE — 93010 ELECTROCARDIOGRAM REPORT: CPT

## 2024-02-25 PROCEDURE — 85025 COMPLETE CBC W/AUTO DIFF WBC: CPT

## 2024-02-25 PROCEDURE — 80354 DRUG SCREENING FENTANYL: CPT

## 2024-02-25 PROCEDURE — 80048 BASIC METABOLIC PNL TOTAL CA: CPT

## 2024-02-25 PROCEDURE — 80307 DRUG TEST PRSMV CHEM ANLYZR: CPT

## 2024-02-25 PROCEDURE — 0241U: CPT

## 2024-02-25 PROCEDURE — 90792 PSYCH DIAG EVAL W/MED SRVCS: CPT | Mod: 95

## 2024-02-25 PROCEDURE — 36415 COLL VENOUS BLD VENIPUNCTURE: CPT

## 2024-02-25 PROCEDURE — 93005 ELECTROCARDIOGRAM TRACING: CPT

## 2024-02-25 PROCEDURE — 99285 EMERGENCY DEPT VISIT HI MDM: CPT

## 2024-02-25 PROCEDURE — 80349 CANNABINOIDS NATURAL: CPT

## 2024-02-25 RX ORDER — MIDAZOLAM HYDROCHLORIDE 1 MG/ML
3 INJECTION, SOLUTION INTRAMUSCULAR; INTRAVENOUS ONCE
Refills: 0 | Status: DISCONTINUED | OUTPATIENT
Start: 2024-02-25 | End: 2024-02-25

## 2024-02-25 RX ORDER — HALOPERIDOL DECANOATE 100 MG/ML
5 INJECTION INTRAMUSCULAR ONCE
Refills: 0 | Status: DISCONTINUED | OUTPATIENT
Start: 2024-02-25 | End: 2024-02-25

## 2024-02-25 NOTE — ED ADULT NURSE NOTE - NSFALLRISKFACTORS_ED_ALL_ED
Chief Complaint   Patient presents with   • Groin Pain     Pt states he is having pain on his left side of his groin that is into his testicle as well. He states that he was doing yard work Sunday 1/13/19, and this is also when the pain started.  He states that the pain is continuous but does ease some with rest. He has tried heat/ice with no relief, and has tried OTC pain medications such as tylenol with no relief.        Subjective   Kenneth Aguilar is a 35 y.o. male    History of Present Illness     Left sided groin pain:  Sunday patient was preforming yard work.  He reports that he was moving busted concrete.  This was moderately strenuous work with heavy lifting.  He noticed acute pain after picking up a heavy piece of concrete.  He describes the pain pain as sharp in nature and radiates down into the testicle.  He went home Sunday afternoon and used heat/cold packs, tylenol, and rest.  He reports that with each day the pain increases and worsens.  He reports the pain is constant, worsens with changing of positions.  He denies urinary symptoms, testicular swelling, penile discharge.  He denies nausea, vomiting, cold chills, body aches.  There is mild temperature today in clinic of 99.3.  Patient reports that several years ago he had inguinal hernia repaired in 2013.  He believes that it was on the left side.      Past Medical History:   Diagnosis Date   • Cholelithiasis    • Coronary artery disease    • Diverticulosis    • Elevated homocysteine (CMS/HCC)    • Fractures    • Gall stones    • Heart attack (CMS/HCC) 06/20/2016   • Heart attack (CMS/HCC)    • Kidney stone    • Kidney stones    • MI, old    • Myocardial infarct (CMS/HCC) 06/2016    stent x 2   • Obesity    • Shingles 2015     Past Surgical History:   Procedure Laterality Date   • APPENDECTOMY  2007   • CARDIAC CATHETERIZATION     • CARDIAC CATHETERIZATION  06/2016    Xience Alpine stent in diagonal and LAD   • CARDIAC CATHETERIZATION N/A 4/19/2017     Procedure: Left Heart Cath;  Surgeon: Renny Wilson MD;  Location:  [facility];  Service:    • CARDIAC CATHETERIZATION N/A 2017    Procedure: Angioplasty-coronary;  Surgeon: Renny Wlison MD;  Location:  [facility];  Service:    • CARDIAC CATHETERIZATION N/A 5/15/2017    Procedure: Left Heart Cath;  Surgeon: Renny Wilson MD;  Location:  [facility];  Service:    • CHOLECYSTECTOMY     • COLONOSCOPY     • CORONARY STENT PLACEMENT  2016    2 stents   • FINGER FUSION Left     thumb   • HERNIA REPAIR     • INTERVENTIONAL RADIOLOGY PROCEDURE N/A 2017    Procedure: Intravascular Ultrasound;  Surgeon: Renny Wilson MD;  Location:  [facility];  Service:    • KIDNEY STONE SURGERY      STONES REMOVED , ,      Family History   Problem Relation Age of Onset   • Arthritis Mother    • Heart attack Mother         30s   • Heart attack Father         30s   • Hypertension Father    • Heart attack Brother         30s   • Cancer Maternal Aunt    • Cancer Maternal Uncle    • Cancer Paternal Uncle    • Diabetes Mother    • Diabetes Father      Social History     Socioeconomic History   • Marital status:      Spouse name: Not on file   • Number of children: Not on file   • Years of education: Not on file   • Highest education level: Not on file   Social Needs   • Financial resource strain: Not on file   • Food insecurity - worry: Not on file   • Food insecurity - inability: Not on file   • Transportation needs - medical: Not on file   • Transportation needs - non-medical: Not on file   Occupational History   • Not on file   Tobacco Use   • Smoking status: Current Every Day Smoker     Packs/day: 0.50     Years: 17.00     Pack years: 8.50     Types: Cigarettes     Last attempt to quit: 7/15/2016     Years since quittin.5   • Smokeless tobacco: Former User   Substance and  Sexual Activity   • Alcohol use: Yes     Comment: OCCASIONAL   • Drug use: No   • Sexual activity: Defer   Other Topics Concern   • Not on file   Social History Narrative    ** Merged History Encounter **          Allergies   Allergen Reactions   • Wasp Venom Swelling     Local swelling     Review of Systems   Constitutional: Positive for activity change. Negative for chills and fever.   Respiratory: Negative for chest tightness, shortness of breath and wheezing.    Cardiovascular: Negative for chest pain and palpitations.   Gastrointestinal: Negative for constipation, diarrhea, nausea and vomiting.   Genitourinary: Positive for testicular pain. Negative for discharge, dysuria, frequency, hematuria, penile pain, scrotal swelling and urgency.   Neurological: Negative for dizziness and light-headedness.     Objective     Vitals:    01/16/19 0824   BP: 142/88   Pulse: 95   Resp: 18   Temp: 99.3 °F (37.4 °C)   SpO2: 97%       Physical Exam   Constitutional: He is oriented to person, place, and time. He appears well-developed and well-nourished. No distress.   HENT:   Head: Normocephalic and atraumatic.   Eyes: Conjunctivae and EOM are normal. Pupils are equal, round, and reactive to light.   Neck: Normal range of motion. Neck supple.   Cardiovascular: Normal rate, regular rhythm and normal heart sounds. Exam reveals no gallop and no friction rub.   No murmur heard.  Pulmonary/Chest: Effort normal and breath sounds normal. No respiratory distress. He has no wheezes. He has no rales. He exhibits no tenderness.   Abdominal: Soft. Bowel sounds are normal. He exhibits no distension. There is no tenderness. There is no rebound and no guarding.   Genitourinary: Testes normal and penis normal. Right testis shows no mass and no tenderness. Left testis shows no mass and no tenderness. No penile tenderness. No discharge found.   Genitourinary Comments: Pain and tenderness to the left groin.  No bulge palpable.   Neurological: He  is alert and oriented to person, place, and time.   Skin: Skin is warm and dry. Capillary refill takes less than 2 seconds. He is not diaphoretic.   Psychiatric: He has a normal mood and affect. His behavior is normal.   Nursing note and vitals reviewed.      Results for orders placed or performed in visit on 01/16/19   POC Urinalysis Dipstick, Automated   Result Value Ref Range    Color Yellow Yellow, Straw, Dark Yellow, Jaimie    Clarity, UA Clear Clear    Specific Gravity  1.025 1.005 - 1.030    pH, Urine 5.0 5.0 - 8.0    Leukocytes Negative Negative    Nitrite, UA Negative Negative    Protein, POC Negative Negative mg/dL    Glucose, UA Negative Negative, 1000 mg/dL (3+) mg/dL    Ketones, UA Negative Negative    Urobilinogen, UA 1 E.U./dL  (A) Normal    Bilirubin 1 mg/dL (A) Negative    Blood, UA Negative Negative     CT Abdomen Pelvis With Contrast  Narrative: PROCEDURE: CT ABDOMEN PELVIS W CONTRAST-     HISTORY: left sided inguinal pain with h/o inguinal hernia repair.;  R10.32-Left lower quadrant pain; R10.32-Left lower quadrant pain     PROCEDURE: The patient was injected with intravenous contrast. Oral  contrast was also administered. Axial images were obtained from the lung  bases to the pubic symphysis by computed tomography.     FINDINGS: No previous CT. Cholecystectomy clips. Abdominal solid organs  demonstrate no acute abnormality. No bowel obstruction. Moderate amount  of stool in the colon. The appendix is not definitively identified but  there are no pericecal inflammatory changes. Probable tiny  fat-containing right inguinal hernia but no left inguinal hernia. Normal  size lymph nodes in both inguinal regions. The lung bases are clear.  Pars defects are present at the lumbosacral junction. Degenerative  changes are most evident at the lumbosacral junction with degenerative  disc disease as well.     Impression: 1. Moderate amount of stool in the colon.  2. No left inguinal hernia. Tiny fat-containing  right inguinal hernia  suspected.  3. Other chronic appearing findings but no acute process otherwise  identified.         This study was performed with techniques to keep radiation doses as low  as reasonably achievable (ALARA). Individualized dose reduction  techniques using automated exposure control or adjustment of mA and/or  kV according to the patient size were employed.      This report was finalized on 1/16/2019 1:29 PM by Cirilo Anderson MD.  US Pelvis Limited  Narrative: LEFT INGUINAL ULTRASOUND     INDICATION: History of hernia repair. Lifting injury with persistent  pain.     FINDINGS: Sonographic assessment of the soft tissues of the left  inguinal region demonstrate no focal fluid collection, adenopathy or  obvious mass. An obvious hernia is not seen but could be further  assessed with CT if indicated.     Impression: No focal sonographic abnormality.     This report was finalized on 1/16/2019 10:55 AM by Cirilo Anderson MD.        Assessment/Plan     Kenneth was seen today for groin pain.    Diagnoses and all orders for this visit:    Groin pain, left  -     Will proceed with STAT US.  Concerned for re-injury of repaired hernia with or without bowel obstruction.  Will contact patient as soon as results are pended.  Have advised patient that pending results, he may need additional evaluation at the emergency department.  If pain intensifies prior to completing US or develops nausea/vomiting, proceed directly to the ED.  Patient is agreeable with plan.   -     CT ABD/Pelvis obtained.  There is no left inguinal hernia.  There was a tiny amount of fat containing right inguinal hernia, patient is asymptomatic on this side.  Called and spoke with patient about these results.  It is possible that the groin pain is associated with musculoskeletal groin strain.  Have advised patient to seek emergency treatment if the pain continues or intensifies.  Patient reports that he is going to go home for now and rest.  I  stressed again the importance of seeking emergency treatment if symptoms progress.  Patient verbalizes understanding and is agreeable.    -     POC Urinalysis Dipstick, Automated  -     US Pelvis Limited    Inguinal pain, left  -     US Pelvis Limited      Return if symptoms worsen or fail to improve.    CHANI McgeeC   No indicators present
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
## 2024-02-25 NOTE — ED PROVIDER NOTE - NSFOLLOWUPCLINICS_GEN_ALL_ED_FT
[facility]  OP Mental Health  [address] [place] [id number]  Phone: [phone]  Fax:   Follow Up Time: 1-3 Days

## 2024-02-25 NOTE — ED PROVIDER NOTE - ATTENDING APP SHARED VISIT CONTRIBUTION OF CARE
33-year-old male, past medical history of depression, anxiety, presenting with his friend for erratic behavior over the last few days described as "manic". As per spouse at the bedside since patient left rehab patient has been more and more manic with erratic and OCD behavior. patient is constantly locking doors, and waking up her and children at home. [place] [place] several times. Pt denies current drug use and SI/HI. here pt rediractable but easily excitable, needs his wife at bedside to remain calm. plan for psych eval

## 2024-02-25 NOTE — ED PROVIDER NOTE - PROGRESS NOTE DETAILS
D/w Tele psych. patient offered voluntary admit and refused. D/w patient and spouse at bedside offered voluntary admission again and patient refusing. spouse at bedside, states will take patient home and attempt out patient f/u as discussed with telepsych with both of them pt made aware of elevated lipid panel, outpt f/u and tx for this
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
## 2024-02-25 NOTE — ED PROVIDER NOTE - TOBACCO USE
----- Message from Shauna Byrne MD sent at 8/19/2021  2:02 PM CDT -----  This is the patient's second negative pregnancy test for isotretinoin.  Please call the patient and informed her of the results.    1)  Review our protocol and update I-pledge  2) Confirm two forms of birth control or abstinence  3)  Advise patient to stop all other acne meds before starting isotretinoin  4)  Have patient make follow up appt(s)  5)  Send isotretinoin 40mg po once daily, disp 30, no refills to her pharmacy.    
iPledge updated and script faxed. Needs to be picked up from pharmacy by 8/25. Message left with female to call clinic.   
Never smoker

## 2024-02-25 NOTE — ED PROVIDER NOTE - PATIENT PORTAL LINK FT
You can access the FollowMyHealth Patient Portal offered by [facility] by registering at the following website: [email or web address]. By joining [facility]’s FollowMyHealth portal, you will also be able to view your health information using other applications (apps) compatible with our system.

## 2024-02-25 NOTE — ED PROVIDER NOTE - OBJECTIVE STATEMENT
33-year-old male, past medical history of depression, anxiety, presenting with his friend for erratic behavior over the last few days described as "manic". As per spouse at the bedside since patient left rehab patient has been more and more manic with erratic and OCD behavior. patient is constantly locking doors, and waking up her and children at home. [place] contacted several times. Pt denies current drug use and SI/HI. Pt spouse voices that patient has told her that he is going to kill himself "by crashing the car." Pt and spouse was at the [facility], patient had appt with new psych MD, but patient was yelling in waiting room and they were asked ot leave and came here. Pt states "I need meds to keep me calm." spouse states "you need to be admitted, and you not allowed back home."

## 2024-02-25 NOTE — ED BEHAVIORAL HEALTH ASSESSMENT NOTE - OTHER PAST PSYCHIATRIC HISTORY (INCLUDE DETAILS REGARDING ONSET, COURSE OF ILLNESS, INPATIENT/OUTPATIENT TREATMENT)
1 psychiatric admission to [facility] from 11/8/23-11/14/23  - was seeing psychiatrist Dr. Claudia Beck ([phone]) x 4 yrs, diagnosed with TRISTON, panic disorder, and unspecified depressive disorder. Tx with Lexapro 20mg daily and Xanax 1mg TID PRN for anxiety.    - 1 psychiatric admission to [facility] from 11/8/23-11/14/23  - was seeing psychiatrist Dr. Claudia Beck ([phone]) x 4 yrs, diagnosed with TRISTON, panic disorder, and unspecified depressive disorder. Tx with Lexapro 20mg daily and Xanax 1mg TID PRN for anxiety but stopped going 11/23   - see HPI

## 2024-02-25 NOTE — ED BEHAVIORAL HEALTH ASSESSMENT NOTE - DIFFERENTIAL
rule out active substance abuse; substance induced mood disorder; rule out an underlying primary mood disorder; rule out Personality Disorder

## 2024-02-25 NOTE — ED BEHAVIORAL HEALTH ASSESSMENT NOTE - SUMMARY
- a UTOX would have been invaluable in this case at this time given that presentation raised index of suspicion of relapse/substance use, and also as Pt manifested an "antsy" quality that is consistent with substance cravings/withdrawals. Given that patient has a ~ 18 year history of daily Xanax use of 4-5 tabs with recent detox/rehab stint in which all benzos were stopped, he is at high risk of relapse/self medicating. Severe benzodiazepine abuse/dependence withdrawal sxs (subacute) can actually last for many months and even years   - hx of cocaine use as well; cannot rule out recent use  - Patient's case further complicated by hx of anabolic steroid abuse with irreversible sequela of hypogonadism for which Patient online buys testosterone injection and injects himself without any medical professional monitoring dose/testosterone serum level etc   - If wanting to give Pt something to go home with, can try Seroquel 100mg Po qhs prn for sleep/mood and start neurontin 100mg PO tid (not to be used long term; until established psych care)   - Pt's symptoms will highly likely need several months of medication trials/changes before he is stabilized. Hence, Patient cannot be given just a random prescription for a psychiatric agent without any psychiatric monitoring, access to care (also not recommending PMD manage his psychiatric sxs) so voluntary admission to inpatient psychiatry was offered several times (as well as mentioned to Pt's wife) and refused. Of note, Pt's wife did not provide any clinical information as to Pt's functioning/behavior to Telepsychiatry team which would have laid the case for an involuntary admission to psychiatry. legal standard has to be met for any such admissions. Discussed with Dr Burrell that wife is more then welcome to call back the Telepsychiatry Team should she want to provide more collateral information which can be used for an involuntary psychiatric admission.

## 2024-02-25 NOTE — ED BEHAVIORAL HEALTH ASSESSMENT NOTE - NSBHSUBSTUSED_PSY_A_CORE
Alcohol/Benzodiazepines/Cannabis/Cocaine/Crack Alcohol/Benzodiazepines/Cannabis/Cocaine/Crack/Other street drugs

## 2024-02-25 NOTE — ED PROVIDER NOTE - PHYSICAL EXAMINATION
Physical Exam    Vital Signs: I have reviewed the initial vital signs.  Constitutional: well-nourished, appears stated age, no acute distress  Eyes: Conjunctiva pink, Sclera clear, PERRLA, EOMI.  Cardiovascular: S1 and S2, regular rate, regular rhythm, well-perfused extremities, radial pulses equal and 2+  Respiratory: unlabored respiratory effort, clear to auscultation bilaterally no wheezing, rales and rhonchi  Gastrointestinal: soft, non-tender abdomen, no pulsatile mass, normal bowl sounds  Musculoskeletal: supple neck, no lower extremity edema, no midline tenderness  Integumentary: warm, dry, no rash  Neurologic: awake, alert, cranial nerves II-XII grossly intact, extremities’ motor and sensory functions grossly intact  Psychiatric: anxious and agitated

## 2024-02-25 NOTE — ED PROVIDER NOTE - CLINICAL SUMMARY MEDICAL DECISION MAKING FREE TEXT BOX
33-year-old male, past medical history of depression, anxiety, presenting with his friend for erratic behavior over the last few days described as "manic". As per spouse at the bedside since patient left rehab patient has been more and more manic with erratic and OCD behavior. patient is constantly locking doors, and waking up her and children at home. [employer] contacted several times. Pt denies current drug use and SI/HI. here pt rediractable but easily excitable, needs his wife at bedside to remain calm. see by psych offered voluntary admission which pt refused. outpt psych f/u. Wife and pt agreeable to plan.

## 2024-02-25 NOTE — ED ADULT TRIAGE NOTE - CHIEF COMPLAINT QUOTE
pt states he is very anxious and is requesting a medication to calm him down, pt is denying any suicidal thoughts

## 2024-02-25 NOTE — ED PROVIDER NOTE - CHIEF COMPLAINT
There is another patient scheduled for a hosp/ED followup at 3pm on Monday October 3rd. Please advise.    The patient is a 33y Male complaining of anxiety.

## 2024-02-25 NOTE — ED BEHAVIORAL HEALTH ASSESSMENT NOTE - OTHER
unable to ascertain via camera adequate activated state / "antsy" quality with mild PMA, with fast speech but interruptible, maintains linear thought process, affect nonlabile but worried and animated when talking low end of fair nonlabile but worried and animated when talking heavily muscled low end of fair to limited depending on context polite

## 2024-02-25 NOTE — ED BEHAVIORAL HEALTH ASSESSMENT NOTE - NSSUICRSKFACTOR_PSY_ALL_CORE
Current and Past Psychiatric Diagnoses/Activating Events/Stressors Current and Past Psychiatric Diagnoses/Presenting Symptoms/Treatment Related Factors/Activating Events/Stressors

## 2024-02-25 NOTE — ED BEHAVIORAL HEALTH ASSESSMENT NOTE - CURRENT MEDICATION
venlafaxine 75mg po qd, Ativan 3mg BID for anxiety, Trazadone 50mg at bedtime [id number] po qd, propranolol (self DC)

## 2024-02-25 NOTE — ED BEHAVIORAL HEALTH ASSESSMENT NOTE - NSSUICPROTFACT_PSY_ALL_CORE
Responsibility to children, family, or others/Identifies reasons for living/Supportive social network of family or friends Responsibility to children, family, or others/Identifies reasons for living/Supportive social network of family or friends/Engaged in work or school

## 2024-02-25 NOTE — ED BEHAVIORAL HEALTH ASSESSMENT NOTE - NSACTIVEVENT_PSY_ALL_CORE
Triggering events leading to humiliation, shame, and/or despair (e.g., Loss of relationship, financial or health status) (real or anticipated)/Chronic pain or other acute medical condition/Substance intoxication or withdrawal Triggering events leading to humiliation, shame, and/or despair (e.g., Loss of relationship, financial or health status) (real or anticipated)/Substance intoxication or withdrawal

## 2024-02-25 NOTE — ED BEHAVIORAL HEALTH ASSESSMENT NOTE - HPI (INCLUDE ILLNESS QUALITY, SEVERITY, DURATION, TIMING, CONTEXT, MODIFYING FACTORS, ASSOCIATED SIGNS AND SYMPTOMS)
32 yo  male, , currently domiciled at home with his wife and their 4 yo daughter, currently employed, with hx of Cannabis and Cocaine use, hx of Anabolic Steroid use with subsequent low testosterone (treated with       , UA + for cocaine, with PMHx of low Testerone secondary to remote anabolic steroid use, PPHx of reported depression/anxiety, no inpatient hospitalizations (however seen for SI in ED on 08/01/23), no suicide attempts, attends marriage counseling, sees private psychiatrist Dr. Beck whos prescribes Alprazolam and Lexapro, was brought in by EMS accompanied by his friend to the ED after being found somnolent in a motel room and surrounded by an empty bottle of Nyquil, cyclobenzaprine tablets, Naprosyn tablets and alprazolam tablets.    Upon assessment the patient appears to be minimizing and guarded surrounding the details of his presentation and week leading up to his admission. He appears to be depressed and anxious, with evidence of bilateral wrist lacerations requiring sutures. He remains organized, oriented, and amenable to discussion. Prior collateral from ED obtained from friend and wife is concerning for recent erratic behavior, mood swings, disappearances, suicidal statements, and being found lethargic surrounded by empty medication bottles. At this time collateral believe that the patient took a bottle of Nyquil in a suicide attempt, and is not safe to be discharged. The patient's behavior is highly concerning for a suicidal attempt, and requires inpatient admission for stabilization and further evaluation. Patient will benefit from continued inpatient psychiatric hospitalization for further medication management and stabilization of symptoms. Patient will be started on venlafaxine 37.5 mg to target patient's persistent anxiety and recent exacerbation of depressive symptoms. Patient denies benefit of home Lexapro 20mg, where he requires Xanax daily for persistent anxiety, so this will be continued. In attempt to bridge to a benzodiazepine with a longer half life, patient's home Xanax will be discontinued and switched to ativan 2mg BID.     Upon today's assessment, patient appears less anxious. Venlafaxine will be continued at 75 mg daily. Ativan will be continued at 3mg BID, due to this dose controlling patient's anxiety. Patient was seen, evaluated, chart reviewed.  As per nursing staff, no events overnight. On evaluation, patient reports that they are doing well. Offered no new complaints. Patient is compliant with medications, denies negative side effects. Endorsed good sleep and appetite. Denies auditory or visual hallucinations. Denies paranoia. Denies suicidal and homicidal ideation, intent, or plan. Patient is planned for discharge today. Patient is agreeable with outpatient follow up.    #MDD  #TRISTON with history of panic disorder  - Continue venlafaxine 75mg daily for patient's low mood and anxiety   - Continue Ativan 3mg BID for anxiety  - Continue Trazadone 50mg at bedtime  - Discontinue Lexapro 20 mg at bedtime   - Discontinue Xanax 1mg TID PRN for anxiety  - Plan for discharge today    #Sutures in place left wrist  #Steri-strips on right wrist  - Keep dressing clean  - Sutures removed on 11/14    #History of Bronchitis  - Start 5 day course of Augmentin 875mg/125mg (day 5 of 5)    #Agitation  - For acute agitation not amenable to verbal redirection give haldol 5mg po q6h PRN, benadryl 50mg q6h PRN, ativan 1mg po q8h PRN with escalation to IM if patient is danger to self/other; if IM formulation used please order repeat EKG to ensure qtc <500ms 34 yo  male, , currently domiciled at home with his wife and their  son & 4 yo daughter, currently employed, used to work in insurance sales, has a BS in business management. with hx of Cannabis and Cocaine use, hx of Anabolic Steroid use (bodybuilding) with subsequent low testosterone (treated with monthly testosterone shots at this time by his PMD who also monitors his serum testosterone levels), with hx of Xanax Dependence (started taking it as sophomore in high school, was using up to 4-5 tabs/day for nearly 20 years), 1 psychiatric admission to [facility] from 23-23 (suspected suicide; found somnolent in a motel room and surrounded by an empty bottle of Nyquil, cyclobenzaprine tablets, Naprosyn tablets and alprazolam tablets with wife reporting erratic behavior, mood swings, disappearances, suicidal statements); started on venlafaxine 37.5 mg and switched from home Xanax to Ativan 3mg BID, trazodone 50mg qhs. Patient subsequently went to a substance rehab for benzos x 2 months where his Effexor was increased to 100mg PO qd, propranolol (Pt stopped due to sexual side effects and dysuria), who has not follow up with a psychiatrist and is currently not under psychiatric care. 34 yo  male, , currently domiciled at home with his wife and their  son & 4 yo daughter, currently employed, used to work in insurance sales, has a BS in business management. with hx of Cannabis and Cocaine use, hx of Anabolic Steroid abuse (used for bodybuilding; used it incorrectly - did not cycle or taper but used continuously) with subsequent low testosterone (treated with testosterone cypionate 200 mg/mL intramuscular solution: 200 milligram(s) intramuscularly 2 times a week Patient purchases this online and self-administers), Pt reports his PMD monitors his serum testosterone levels), with hx of Xanax Dependence (started taking it as sophomore in high school, was using up to 4-5 tabs/day for nearly 20 years), 1 psychiatric admission to [facility] from 23-23 (suspected suicide; found somnolent in a motel room and surrounded by an empty bottle of Nyquil, cyclobenzaprine tablets, Naprosyn tablets and alprazolam tablets with wife reporting erratic behavior, mood swings, disappearances, suicidal statements); started on venlafaxine 37.5 mg and switched from home Xanax to Ativan 3mg BID, trazodone 50mg qhs. Post discharge, Pt did not attend his follow up appt with Dr. Claudia Beck on 11/15/23 and instead checked himself into [facility] in [place] for treatment where he stayed for 2 months where his Effexor was increased to 100mg PO qd, propranolol was added (Pt stopped due to sexual side effects and dysuria), who has not follow up with a psychiatrist and is currently not under psychiatric care. Patient and wife came to the ED today (reportedly was at [facility] psychiatry office earlier today but were asked to leave for being loud/disruptive) seeking medication to calm, Patient down. As per ED MD note, Pt's wife reported erratic behavior in the home and making suicidal threats, hx of substance abuse hence urine toxicology was requested by [facility] from Dr Burrell prior to Patient being seen. 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 Patient says that he has several friends "in the same boat as him" who are on neurontin, buspar, Zoloft and he was wondering how come he was not prescribed that and would like a prescription for a medication to take home. Patient explained that psychiatric treatment is lengthy and complex such as each person is different so finding a working regimen can take months, medications need to be tapered and clinical response/side effects monitored, especially in complex cases where there is a history of addiction and medical issues. Moreover, Pt's complaints will not be taken care of and effectively treated in a one-time stop in the emergency room and going home with a prescription for any medication. Patient said that he just spoke to his PMD and he is willing to prescribe whatever psychiatric medication is recommended - Pt was then asked why PMD is not prescribing them now. Unclear reply. Patient then said that he is comfortable and likes his PMD and he has to work and does not have time to see a psychiatrist several times a week etc. Patient told that seeing a patient several times a week is usual practice during active medication management/changes/starts and some appts can be done over phone or remotely etc. Patient said that he contacted some psychiatrists before and everyone required regular weekly attendance / appt initially and no one was willing to start medications on the first appointment. (standard practice). Again, Patient was given psychoeducation about the nature and course of psychopharmacological management which is very different than routine course of medications such as antibiotics. Patient then was offered an inpatient psychiatric admission as that is the setting for immediate, active medication treatment/initiation. Patient said that he cannot do that as he has to work.     Laex discussion was held with Patient is he manifested symptoms consistent with substance cravings/recent substance use. Patient denied that he is craving Xanax but did admit that he never had the same effect of another benzo like ativan, klonopin as he did with Xanax and he used it daily since sophomore year in high school up to 4-5tabs/day and he is aware that protracted withdrawal sxs can last months to even years. Patient denies benzo use (no UTOX done). Denies any suicidal/homicidal ideation, intent or plan; admits to high emotionality arguments with his wife which he blames himself (ie his use hx) and says she is a great wife to him. No reports of guns in the home.     COLLATERAL FROM WIFE JESSICA: please see separate  note; basically did not provide collateral information for which a case could be made for an involuntary admission. Wife basically hung up on [facility], never called back and said ED was a "waste of time" after being told that no RX will be given at this time.     ISTOP Reference #: [id number]  2023	TESTOSTERONE  MG/ML		12.0ml	60	MD MIR, CAMPOS Aquino	[facility]

## 2024-02-25 NOTE — ED PROVIDER NOTE - NSFOLLOWUPINSTRUCTIONS_ED_ALL_ED_FT
Follow up with your primary care doctor and out patient psych doctor 1-2 days    Anxiety    WHAT YOU NEED TO KNOW:    Anxiety is a condition that causes you to feel extremely worried or nervous. The feelings are so strong that they can cause problems with your daily activities or sleep. Anxiety may be triggered by something you fear, or it may happen without a cause. Family or work stress, smoking, caffeine, and alcohol can increase your risk for anxiety. Certain medicines or health conditions can also increase your risk. Anxiety can become a long-term condition if it is not managed or treated.     DISCHARGE INSTRUCTIONS:    Call 911 if:     You have chest pain, tightness, or heaviness that may spread to your shoulders, arms, jaw, neck, or back.      You feel like hurting yourself or someone else.     Contact your healthcare provider if:     Your symptoms get worse or do not get better with treatment.      Your anxiety keeps you from doing your regular daily activities.      You have new symptoms since your last visit.      You have questions or concerns about your condition or care.    Medicines:     Medicines may be given to help you feel more calm and relaxed, and decrease your symptoms.      Take your medicine as directed. Contact your healthcare provider if you think your medicine is not helping or if you have side effects. Tell him of her if you are allergic to any medicine. Keep a list of the medicines, vitamins, and herbs you take. Include the amounts, and when and why you take them. Bring the list or the pill bottles to follow-up visits. Carry your medicine list with you in case of an emergency.    Follow up with your healthcare provider within 2 weeks or as directed: Write down your questions so you remember to ask them during your visits.    Manage anxiety:     Talk to someone about your anxiety. Your healthcare provider may suggest counseling. Cognitive behavioral therapy can help you understand and change how you react to events that trigger your symptoms. You might feel more comfortable talking with a friend or family member about your anxiety. Choose someone you know will be supportive and encouraging.      Find ways to relax. Activities such as exercise, meditation, or listening to music can help you relax. Spend time with friends, or do things you enjoy.      Practice deep breathing. Deep breathing can help you relax when you feel anxious. Focus on taking slow, deep breaths several times a day, or during an anxiety attack. Breathe in through your nose and out through your mouth.       Create a regular sleep routine. Regular sleep can help you feel calmer during the day. Go to sleep and wake up at the same times every day. Do not watch television or use the computer right before bed. Your room should be comfortable, dark, and quiet.       Eat a variety of healthy foods. Healthy foods include fruits, vegetables, low-fat dairy products, lean meats, fish, whole-grain breads, and cooked beans. Healthy foods can help you feel less anxious and have more energy.      Exercise regularly. Exercise can increase your energy level. Exercise may also lift your mood and help you sleep better. Your healthcare provider can help you create an exercise plan.      Do not smoke. Nicotine and other chemicals in cigarettes and cigars can increase anxiety. Ask your healthcare provider for information if you currently smoke and need help to quit. E-cigarettes or smokeless tobacco still contain nicotine. Talk to your healthcare provider before you use these products.       Do not have caffeine. Caffeine can make your symptoms worse. Do not have foods or drinks that are meant to increase your energy level.      Limit or do not drink alcohol. Ask your healthcare provider if alcohol is safe for you. You may not be able to drink alcohol if you take certain anxiety or depression medicines. Limit alcohol to 1 drink per day if you are a woman. Limit alcohol to 2 drinks per day if you are a man. A drink of alcohol is 12 ounces of beer, 5 ounces of wine, or 1½ ounces of liquor.       Do not use drugs. Drugs can make your anxiety worse. It can also make anxiety hard to manage. Talk to your healthcare provider if you use drugs and want help to quit.          © Copyright [employer] 2019 All illustrations and images included in CareNotes are the copyrighted property of [employer] Inc[employer] or [employer].

## 2024-02-25 NOTE — ED BEHAVIORAL HEALTH NOTE - BEHAVIORAL HEALTH NOTE
========================     FOR EACH COLLATERAL     ========================     NAME: Viridiana.     NUMBER     RELATIONSHIP: Wife    RELIABILITY: Reliable historian.     COMMENTS: [facility] contacted collateral to assess patients baseline behavior and to learn background information the team can use to better assess the patient.     Patient gave consent for collateral contact?     ( ) Yes     ( x ) No     Rationale for overriding objection     ( ) Lack of capacity. Details: ________     ( x) Assessing risk of danger to self/others. Details: ________     Rationale for selecting specific collateral source     ( x) Potential to impact risk of danger to self/others and no alternative equivalent. Details:     Collateral has requested that the information provided remain confidential: Yes [ ] No [ x ]     Collateral has provided information that patient is/may be unaware of: Yes [ ] No [ x ]         This writer ([facility])  called the patients wife (Viridiana) for collateral information.     The collateral requested that the patient be connected to a psychiatrist for medication. This writer informed the collateral that the psychiatrist is reviewing the patient for a potential admission, collateral then stated that inpatient admission would be a waste of time due to the fact that the patient attempted inpatient care previously. Collateral put this writer on hold while on the phone and did not return. This writer then called the collateral back and left a message and at the time of this documentation, the collateral never returned the phone call.

## 2024-02-25 NOTE — ED ADULT NURSE NOTE - NSFALLUNIVINTERV_ED_ALL_ED
Bed/Stretcher in lowest position, wheels locked, appropriate side rails in place/Call bell, personal items and telephone in reach/Instruct patient to call for assistance before getting out of bed/chair/stretcher/Non-slip footwear applied when patient is off stretcher/[place] to call system/Physically safe environment - no spills, clutter or unnecessary equipment/Purposeful proactive rounding/Room/bathroom lighting operational, light cord in reach

## 2024-02-25 NOTE — ED BEHAVIORAL HEALTH ASSESSMENT NOTE - VIOLENCE RISK FACTORS:
Substance abuse Substance abuse/Affective dysregulation/Lack of insight into violence risk/need for treatment/Noncompliance with treatment

## 2024-02-25 NOTE — ED ADULT NURSE NOTE - NSICDXPASTMEDICALHX_GEN_ALL_CORE_FT
PAST MEDICAL HISTORY:  H/O anxiety state     Low testosterone in male     Manic-depression     Substance abuse

## 2024-02-25 NOTE — ED BEHAVIORAL HEALTH ASSESSMENT NOTE - RISK ASSESSMENT
Chronic risk factors: male gender, hx of addiction/substance abuse, cannot rule out active substance abuse; substance induced mood disorder; rule out an underlying primary mood disorder; rule out Personality Disorder; suspected prior SA/unclear if made while intoxicated; charted hx of volatile marital relations (hx of attending marriage counseling). reports last few years of financial stressors after father's death. Protective factors: young; , no known hx of physical aggression/violence; no legal issues known; motivated for help; articulate; strong family support; no known access to guns, stable domicile, engaged with work/his children, wife. Acute risk factors identified: noncompliant with outpatient treatment/monitoring, not on efficacious medications, resistance to establish regular outpt care, reports "anxiety" (affective dysregulation) mitigated by offer of a voluntary psych admission

## 2024-02-26 LAB
AMPHET UR-MCNC: NEGATIVE — SIGNIFICANT CHANGE UP
BARBITURATES UR SCN-MCNC: NEGATIVE — SIGNIFICANT CHANGE UP
BENZODIAZ UR-MCNC: NEGATIVE — SIGNIFICANT CHANGE UP
COCAINE METAB.OTHER UR-MCNC: NEGATIVE — SIGNIFICANT CHANGE UP
DRUG SCREEN 1, URINE RESULT: SIGNIFICANT CHANGE UP
FENTANYL UR QL: NEGATIVE — SIGNIFICANT CHANGE UP
METHADONE UR-MCNC: NEGATIVE — SIGNIFICANT CHANGE UP
OPIATES UR-MCNC: NEGATIVE — SIGNIFICANT CHANGE UP
OXYCODONE UR-MCNC: NEGATIVE — SIGNIFICANT CHANGE UP
PCP UR-MCNC: NEGATIVE — SIGNIFICANT CHANGE UP
PROPOXYPHENE QUALITATIVE URINE RESULT: NEGATIVE — SIGNIFICANT CHANGE UP
THC UR QL: POSITIVE

## 2024-03-01 LAB
CARBOXYTHC UR CFM-MCNC: 63 NG/ML — HIGH
TOXICOLOGIST REVIEW: POSITIVE — SIGNIFICANT CHANGE UP

## 2024-04-12 NOTE — BH TREATMENT PLAN - NSTXDEPRESGOAL_PSY_ALL_CORE
Attend and participate in at least 2 groups daily despite low mood/energy
How Severe Is It?: mild
Is This A New Presentation, Or A Follow-Up?: Lesion

## 2024-10-13 NOTE — BH SOCIAL WORK INITIAL PSYCHOSOCIAL EVALUATION - NSBHPROFILEREVIEW_PSY_ALL_CORE
Yes Patient came in with the complaints of  Right upper quadrant pain started 3 days ago with decreased appetite since 2 days. Patient brian to urgent care and they send patient to ED for further evaluation. No other complaints. Specimens collected and sent by Marisel castañeda RN. Patient tolerating fluids well. No distress noted. Nursing care continues

## 2024-12-10 NOTE — BH INPATIENT PSYCHIATRY ASSESSMENT NOTE - DESCRIPTION
Pre-Op Note:  Today's date: December 10, 2024    Harshad Ng is a 55 year old male who presents for a preoperative evaluation.    Surgical Information:  Surgery/Procedure: Preoperative examination in anticipation of right shoulder arthroscopic surgery at [facility] January 9, 2025 right-hand-dominant individual surgeon is Dr. SKYLAR Villagomez.  Previous dislocation Nail labrum tear repair needed.  Surgery Location: [facility]  Surgeon: Dr. SKYLAR Villagomez  Surgery Date: January 9, 2025  Fax number for surgical facility: Unknown at this time.    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  Prior history of dislocation of right shoulder right-hand-dominant.  Needs labrum repair arthroscopically as outlined above.    ROS:   14 point negative    Medications:     Current Outpatient Medications:     vitamin B-Complex, Take 1 tablet by mouth daily, Disp: , Rfl:     vitamin C (ASCORBIC ACID) 1000 MG TABS, Take 1,000 mg by mouth daily, Disp: , Rfl:     Vitamin D (Cholecalciferol) 25 MCG (1000 UT) CAPS, , Disp: , Rfl:     vitamin E (TOCOPHEROL) 400 units (180 mg) capsule, Take 400 Units by mouth, Disp: , Rfl:      Allergies:  No Known Allergies    Immunizations:   Immunization History   Administered Date(s) Administered    COVID-19 MONOVALENT 12+ (Pfizer) 11/30/2021    COVID-19 Vaccine (C[employer]it[employer]fm) 04/10/2021    Flu, Unspecified 11/16/2005, 10/01/2009, 10/16/2012, 12/10/2013, 01/24/2017, 10/09/2019    Influenza (IIV3) PF 11/16/2005, 10/16/2012    Influenza Vaccine >6 months,quad, PF 12/13/2017, 11/09/2020, 11/15/2021    Influenza, Split Virus, Trivalent, Pf (Fluzone\Fluarix) 12/10/2013    Influenza,INJ,MDCK,PF,Quad >6mo(Flucelvax) 11/27/2018, 10/09/2019, 10/27/2022, 11/05/2023    TD,PF 7+ (Tenivac) 01/15/2004    TDAP (Adacel,Boostrix) 07/21/2010, 07/25/2017    Td,adult,historic,unspecified 01/15/2004    Tdap (Adult) Unspecified Formulation 01/15/2004    Zoster recombinant  "adjuvanted (SHINGRIX) 2021, 2022     Immunizations reviewed and up-to-date.    Health Maintenance:   Immunizations vaccines reviewed up-to-date.    Past Medical History:   Past Medical History:   Diagnosis Date    Cancer (H)     skin   Non-smoker social alcohol no drug allergies no prior significant operations    Skin cancer surgery.  Facial    Past Surgical History:   Past Surgical History:   Procedure Laterality Date    HERNIA REPAIR      SOFT TISSUE SURGERY        No anesthetic complications with any prior surgery.  Family History:   Mother living 87 has cognitive decline and dementia.    Father  91 after a hip fracture.    4 sons well 2 of the older sons are at [place] [employer] to are at [employer].  The elder sons are studying business.  Wife is well.      Exam:  Vitals:/70 (BP Location: Right arm, Patient Position: Sitting, Cuff Size: Adult Regular)   Pulse 60   Temp 97.6  F (36.4  C) (Oral)   Resp 16   Ht 1.812 m (5' 11.34\")   Wt 85.9 kg (189 lb 4.8 oz)   SpO2 99%   BMI 26.15 kg/m    Previous examinations done as part of the male clinic executive exams.  1 forthcoming.    Chest clear heart tones normal abdomen benign extremities free of edema cyanosis or clubbing skin negative facial scars from prior skin cancer surgery noted.  Well-healed cosmetically excellent results.  Lymph negative neuro negative psych normal no thyroid enlargement no thyroid nodules no carotid bruits no lymphadenopathy appreciated lymph bearing areas shoulder and orthopedic examination per orthopedist    Assessment and Plan:  (Z01.818) Preoperative examination  (primary encounter diagnosis)  Comment: Medically acceptable risk for anticipated surgery there is no family history of anesthetic complications like malignant hyperthermia associated with general anesthesia and the patient himself has had no problems with anesthesia.  Plan: CBC with platelets, Comprehensive metabolic         panel        " Preoperative examination without any other stipulations or recommendations for the January 19 surgery arthroscopic right shoulder.  [facility] I have asked the patient to provide the clinic our clinic with fax number for this report.        Harshad Romero MD    Internal Medicine    The longitudinal plan of care for the diagnosis(es)/condition(s) as documented were addressed during this visit. Due to the added complexity in care, I will continue to support [facility] in the subsequent management and with ongoing continuity of care.     Lives with wife and 2 kids. 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 Reports bachelors degree

## 2025-06-26 ENCOUNTER — APPOINTMENT (OUTPATIENT)
Dept: ORTHOPEDIC SURGERY | Facility: CLINIC | Age: 35
End: 2025-06-26

## 2025-06-26 ENCOUNTER — RESULT CHARGE (OUTPATIENT)
Age: 35
End: 2025-06-26

## 2025-06-26 PROCEDURE — 20605 DRAIN/INJ JOINT/BURSA W/O US: CPT | Mod: RT

## 2025-06-26 PROCEDURE — 73110 X-RAY EXAM OF WRIST: CPT | Mod: RT

## 2025-06-26 PROCEDURE — 20610 DRAIN/INJ JOINT/BURSA W/O US: CPT | Mod: LT

## 2025-06-26 PROCEDURE — 73030 X-RAY EXAM OF SHOULDER: CPT | Mod: LT

## 2025-06-26 PROCEDURE — 99203 OFFICE O/P NEW LOW 30 MIN: CPT | Mod: 25
